# Patient Record
Sex: MALE | Race: WHITE | HISPANIC OR LATINO | Employment: FULL TIME | URBAN - METROPOLITAN AREA
[De-identification: names, ages, dates, MRNs, and addresses within clinical notes are randomized per-mention and may not be internally consistent; named-entity substitution may affect disease eponyms.]

---

## 2024-05-21 ENCOUNTER — HOSPITAL ENCOUNTER (OUTPATIENT)
Dept: GENERAL RADIOLOGY | Age: 69
Discharge: HOME | End: 2024-05-21

## 2024-05-21 DIAGNOSIS — R07.9 CHEST PAIN: ICD-10-CM

## 2024-05-21 PROCEDURE — 71046 X-RAY EXAM CHEST 2 VIEWS: CPT

## 2024-05-22 DIAGNOSIS — R10.11 RUQ PAIN: Primary | ICD-10-CM

## 2024-05-25 ENCOUNTER — E-ADVICE (OUTPATIENT)
Dept: OTHER | Age: 69
End: 2024-05-25

## 2024-05-31 ENCOUNTER — HOSPITAL ENCOUNTER (OUTPATIENT)
Dept: ULTRASOUND IMAGING | Age: 69
End: 2024-05-31
Attending: INTERNAL MEDICINE

## 2024-05-31 DIAGNOSIS — R10.11 RUQ PAIN: ICD-10-CM

## 2024-05-31 PROCEDURE — 76705 ECHO EXAM OF ABDOMEN: CPT

## 2024-06-04 ENCOUNTER — HOSPITAL ENCOUNTER (OUTPATIENT)
Dept: GASTROENTEROLOGY | Age: 69
Discharge: HOME | End: 2024-06-04
Attending: INTERNAL MEDICINE

## 2024-06-04 ENCOUNTER — ANESTHESIA EVENT (OUTPATIENT)
Dept: GASTROENTEROLOGY | Age: 69
End: 2024-06-04

## 2024-06-04 ENCOUNTER — ANESTHESIA (OUTPATIENT)
Dept: GASTROENTEROLOGY | Age: 69
End: 2024-06-04

## 2024-06-04 VITALS
SYSTOLIC BLOOD PRESSURE: 126 MMHG | TEMPERATURE: 97 F | WEIGHT: 172.4 LBS | BODY MASS INDEX: 26.13 KG/M2 | DIASTOLIC BLOOD PRESSURE: 83 MMHG | HEIGHT: 68 IN | RESPIRATION RATE: 13 BRPM | HEART RATE: 65 BPM | OXYGEN SATURATION: 95 %

## 2024-06-04 DIAGNOSIS — Z12.11 ENCOUNTER FOR SCREENING FOR MALIGNANT NEOPLASM OF COLON: ICD-10-CM

## 2024-06-04 PROCEDURE — 10004451 HB PACU RECOVERY 1ST 30 MINUTES

## 2024-06-04 PROCEDURE — 10002807 HB RX 258: Performed by: INTERNAL MEDICINE

## 2024-06-04 PROCEDURE — 13000008 HB ANESTHESIA MAC OUTSIDE OR

## 2024-06-04 PROCEDURE — 13000001 HB PHASE II RECOVERY EA 30 MINUTES

## 2024-06-04 PROCEDURE — 13000024 HB GI COMPLEX CASE S/U + 1ST 15 MIN

## 2024-06-04 PROCEDURE — 10002807 HB RX 258: Performed by: ANESTHESIOLOGY

## 2024-06-04 PROCEDURE — 88305 TISSUE EXAM BY PATHOLOGIST: CPT | Performed by: INTERNAL MEDICINE

## 2024-06-04 PROCEDURE — 13000025 HB GI COMPLEX CASE EACH ADD MINUTE

## 2024-06-04 PROCEDURE — 10002800 HB RX 250 W HCPCS: Performed by: ANESTHESIOLOGY

## 2024-06-04 PROCEDURE — 10002801 HB RX 250 W/O HCPCS: Performed by: ANESTHESIOLOGY

## 2024-06-04 RX ORDER — PROPOFOL 10 MG/ML
INJECTION, EMULSION INTRAVENOUS PRN
Status: DISCONTINUED | OUTPATIENT
Start: 2024-06-04 | End: 2024-06-04

## 2024-06-04 RX ORDER — 0.9 % SODIUM CHLORIDE 0.9 %
2 VIAL (ML) INJECTION EVERY 12 HOURS SCHEDULED
Status: CANCELLED | OUTPATIENT
Start: 2024-06-04

## 2024-06-04 RX ORDER — SODIUM CHLORIDE 9 MG/ML
INJECTION, SOLUTION INTRAVENOUS CONTINUOUS PRN
Status: DISCONTINUED | OUTPATIENT
Start: 2024-06-04 | End: 2024-06-04

## 2024-06-04 RX ORDER — SODIUM CHLORIDE 9 MG/ML
INJECTION, SOLUTION INTRAVENOUS CONTINUOUS
Status: DISCONTINUED | OUTPATIENT
Start: 2024-06-04 | End: 2024-06-06 | Stop reason: HOSPADM

## 2024-06-04 RX ORDER — FAMOTIDINE 20 MG/1
20 TABLET, FILM COATED ORAL 2 TIMES DAILY
COMMUNITY
Start: 2024-05-21

## 2024-06-04 RX ADMIN — SODIUM CHLORIDE: 9 INJECTION, SOLUTION INTRAVENOUS at 08:39

## 2024-06-04 RX ADMIN — PROPOFOL INJECTABLE EMULSION 100 MCG/KG/MIN: 10 INJECTION, EMULSION INTRAVENOUS at 08:43

## 2024-06-04 RX ADMIN — FENTANYL CITRATE 50 MCG: 50 INJECTION INTRAMUSCULAR; INTRAVENOUS at 08:42

## 2024-06-04 RX ADMIN — FENTANYL CITRATE 50 MCG: 50 INJECTION INTRAMUSCULAR; INTRAVENOUS at 08:50

## 2024-06-04 RX ADMIN — PROPOFOL 100 MG: 10 INJECTION, EMULSION INTRAVENOUS at 08:43

## 2024-06-04 RX ADMIN — SODIUM CHLORIDE: 9 INJECTION, SOLUTION INTRAVENOUS at 08:15

## 2024-06-04 SDOH — SOCIAL STABILITY: SOCIAL INSECURITY: HOW OFTEN DOES ANYONE, INCLUDING FAMILY AND FRIENDS, INSULT OR TALK DOWN TO YOU?: NEVER

## 2024-06-04 SDOH — SOCIAL STABILITY: SOCIAL INSECURITY: HOW OFTEN DOES ANYONE, INCLUDING FAMILY AND FRIENDS, PHYSICALLY HURT YOU?: NEVER

## 2024-06-04 SDOH — SOCIAL STABILITY: SOCIAL INSECURITY: HOW OFTEN DOES ANYONE, INCLUDING FAMILY AND FRIENDS, SCREAM OR CURSE AT YOU?: NEVER

## 2024-06-04 SDOH — SOCIAL STABILITY: SOCIAL INSECURITY: HOW OFTEN DOES ANYONE, INCLUDING FAMILY AND FRIENDS, THREATEN YOU WITH HARM?: NEVER

## 2024-06-04 ASSESSMENT — PAIN SCALES - GENERAL
PAINLEVEL_OUTOF10: 0

## 2024-06-04 ASSESSMENT — ACTIVITIES OF DAILY LIVING (ADL)
HISTORY OF FALLING IN THE LAST YEAR (PRIOR TO ADMISSION): NO
ADL_BEFORE_ADMISSION: INDEPENDENT
ADL_SHORT_OF_BREATH: NO
SENSORY_SUPPORT_DEVICES: EYEGLASSES;DENTURES
ADL_SCORE: 12
NEEDS_ASSIST: NO

## 2024-06-06 LAB
ASR DISCLAIMER: NORMAL
CASE RPRT: NORMAL
CLINICAL INFO: NORMAL
PATH REPORT.FINAL DX SPEC: NORMAL
PATH REPORT.GROSS SPEC: NORMAL

## 2024-06-11 ENCOUNTER — HOSPITAL ENCOUNTER (OUTPATIENT)
Dept: ULTRASOUND IMAGING | Age: 69
Discharge: HOME | End: 2024-06-11
Attending: INTERNAL MEDICINE

## 2024-06-11 DIAGNOSIS — R10.11 RUQ PAIN: ICD-10-CM

## 2024-06-11 PROCEDURE — 76775 US EXAM ABDO BACK WALL LIM: CPT

## 2024-07-03 NOTE — PROGRESS NOTES
[facility]  Initial Office Consultation    HPI:   Olayinka Echevarria is a 69 year old male with PMHx of CVA and GERD here today for an elevated PSA. Presents today with daughter.    Patient states he had labs drawn with his PCP at an outside facility, and was informed that his PSA was elevated. At that time, his PSA was 38.1. No urinary complaints.  IPSS score of 12  (4/3/1/3/0/0/1) with QoL index of 1. PVR 3mL. He denies unexplained weight loss and bone pain. He has no family history of prostate cancer.    Past Medical History:    CVA (cerebral vascular accident) (HCC)    GERD (gastroesophageal reflux disease)    Prediabetes     History reviewed. No pertinent surgical history.  History reviewed. No pertinent family history.  Social History     Socioeconomic History    Marital status:      Social Determinants of Health     Financial Resource Strain: Not at Risk (2024)    Received from [employer]    Financial Resource Strain     Financial Resource Strain: 1   Food Insecurity: Not at Risk (2024)    Received from [employer]    Food Insecurity     Food: 1   Transportation Needs: Not at Risk (2024)    Received from [employer]    Transportation Needs     Transportation: 1   Physical Activity: Not on File (5/3/2024)    Received from [employer]    Physical Activity     Physical Activity: 0   Stress: Not on File (5/3/2024)    Received from [employer]    Stress     Stress: 0   Social Connections: Not on File (5/3/2024)    Received from [employer]    Social Connections     Social Connections and Isolation: 0   Housing Stability: Not at Risk (2024)    Received from [employer]    Housing Stability     Housin     Current Outpatient Medications   Medication Sig Dispense Refill    aspirin 81 MG Oral Tab EC Take 1 tablet (81 mg total) by mouth daily.      atorvastatin 40 MG Oral Tab Take 1 tablet (40 mg total) by mouth nightly.      omeprazole 20 MG Oral Capsule Delayed Release 1 twenty minutes before meal Orally twice a  day for 30 days      celecoxib 50 MG Oral Cap Take 1 capsule (50 mg total) by mouth daily as needed.         Allergies: Patient has no known allergies.    REVIEW OF SYSTEMS:  Review of Systems   All other systems reviewed and are negative.        EXAM:  /79 (BP Location: Right arm, Patient Position: Sitting, Cuff Size: adult)   Pulse 87   Ht 5' 6\" (1.676 m)   Wt 171 lb (77.6 kg)   BMI 27.60 kg/m²     Physical Exam  Constitutional:       Appearance: Normal appearance.   HENT:      Head: Normocephalic.      Mouth/Throat:      Mouth: Mucous membranes are moist.   Cardiovascular:      Pulses: Normal pulses.   Pulmonary:      Effort: Pulmonary effort is normal.   Abdominal:      General: Abdomen is flat.      Palpations: Abdomen is soft.   Genitourinary:     Prostate: Normal.      Comments: EFFIE: Prostate enlarged, smooth, non-tender and without nodules  Skin:     General: Skin is warm and dry.   Neurological:      Mental Status: He is alert and oriented to person, place, and time.   Psychiatric:         Mood and Affect: Mood normal.         Behavior: Behavior normal.         Thought Content: Thought content normal.         Judgment: Judgment normal.          LABS:  No results found for: \"PSA\", \"QPSA\", \"TOTPSASCREEN\"  No results found for: \"WBC\", \"RBC\", \"HGB\", \"HCT\", \"MCV\", \"MCH\", \"MCHC\", \"RDW\", \"PLT\", \"MPV\"  No results found for: \"GLU\", \"BUN\", \"BUNCREA\", \"CREATSERUM\", \"ANIONGAP\", \"GFR\", \"GFRNAA\", \"GFRAA\", \"CA\", \"NA\", \"K\", \"CL\", \"CO2\"  No results found for: \"PTP\", \"PT\", \"INR\"    IMAGING:  No results found.    IMPRESSION:  Elevated PSA    PLAN:  - Repeat PSA in 6 weeks  - Schedule follow-up for after repeat labs    Adalgisa Kessler, JESS  7/5/2024

## 2024-07-05 ENCOUNTER — OFFICE VISIT (OUTPATIENT)
Dept: SURGERY | Facility: CLINIC | Age: 69
End: 2024-07-05

## 2024-07-05 VITALS
SYSTOLIC BLOOD PRESSURE: 121 MMHG | WEIGHT: 171 LBS | BODY MASS INDEX: 27.48 KG/M2 | DIASTOLIC BLOOD PRESSURE: 79 MMHG | HEIGHT: 66 IN | HEART RATE: 87 BPM

## 2024-07-05 DIAGNOSIS — R97.20 ELEVATED PSA: Primary | ICD-10-CM

## 2024-07-05 PROCEDURE — 99202 OFFICE O/P NEW SF 15 MIN: CPT

## 2024-07-05 RX ORDER — ASPIRIN 81 MG/1
81 TABLET ORAL DAILY
COMMUNITY
Start: 2024-06-21

## 2024-07-05 RX ORDER — ATORVASTATIN CALCIUM 40 MG/1
40 TABLET, FILM COATED ORAL NIGHTLY
COMMUNITY
Start: 2024-06-21

## 2024-07-05 RX ORDER — OMEPRAZOLE 20 MG/1
CAPSULE, DELAYED RELEASE ORAL
COMMUNITY
Start: 2024-06-28

## 2024-07-05 RX ORDER — CELECOXIB 50 MG/1
50 CAPSULE ORAL DAILY PRN
COMMUNITY

## 2024-08-08 ENCOUNTER — LAB ENCOUNTER (OUTPATIENT)
Dept: LAB | Age: 69
End: 2024-08-08
Payer: MEDICARE

## 2024-08-08 DIAGNOSIS — R97.20 ELEVATED PSA: ICD-10-CM

## 2024-08-08 LAB — PSA SERPL-MCNC: 41.52 NG/ML (ref ?–4)

## 2024-08-08 PROCEDURE — 84153 ASSAY OF PSA TOTAL: CPT

## 2024-08-12 NOTE — PROGRESS NOTES
[facility]  Initial Office Consultation    HPI:   Olayinka Echevarria is a 69 year old male here today for follow-up of an elevated PSA. Previous visit was 2024. Presents today with daughter.    Patient states he had labs drawn with his PCP at an outside facility, and was informed that his PSA was elevated. At that time, his PSA was 38.1. Current PSA of 41.52. No urinary complaints. He denies unexplained weight loss and bone pain. He has no family history of prostate cancer.      Past Medical History:    CVA (cerebral vascular accident) (HCC)    GERD (gastroesophageal reflux disease)    Prediabetes     No past surgical history on file.  No family history on file.  Social History     Socioeconomic History    Marital status:      Social Determinants of Health     Financial Resource Strain: Not at Risk (2024)    Received from [employer]    Financial Resource Strain     Financial Resource Strain: 1   Food Insecurity: Not at Risk (2024)    Received from [employer]    Food Insecurity     Food: 1   Transportation Needs: Not at Risk (2024)    Received from [employer]    Transportation Needs     Transportation: 1   Physical Activity: Not on File (5/3/2024)    Received from [employer]    Physical Activity     Physical Activity: 0   Stress: Not on File (5/3/2024)    Received from [employer]    Stress     Stress: 0   Social Connections: Not on File (5/3/2024)    Received from [employer]    Social Connections     Social Connections and Isolation: 0   Housing Stability: Not at Risk (2024)    Received from [employer]    Housing Stability     Housin     Current Outpatient Medications   Medication Sig Dispense Refill    aspirin 81 MG Oral Tab EC Take 1 tablet (81 mg total) by mouth daily.      atorvastatin 40 MG Oral Tab Take 1 tablet (40 mg total) by mouth nightly.      omeprazole 20 MG Oral Capsule Delayed Release 1 twenty minutes before meal Orally twice a day for 30 days      celecoxib 50 MG Oral Cap Take 1  capsule (50 mg total) by mouth daily as needed.         Allergies: Patient has no known allergies.    REVIEW OF SYSTEMS:  Review of Systems   All other systems reviewed and are negative.        EXAM:  There were no vitals taken for this visit.    Physical Exam  Constitutional:       Appearance: Normal appearance.   HENT:      Head: Normocephalic.      Mouth/Throat:      Mouth: Mucous membranes are moist.   Cardiovascular:      Pulses: Normal pulses.   Pulmonary:      Effort: Pulmonary effort is normal.   Abdominal:      General: Abdomen is flat.      Palpations: Abdomen is soft.   Genitourinary:     Prostate: Normal.      Comments: EFFIE: Prostate enlarged, smooth, non-tender and without nodules  Skin:     General: Skin is warm and dry.   Neurological:      Mental Status: He is alert and oriented to person, place, and time.   Psychiatric:         Mood and Affect: Mood normal.         Behavior: Behavior normal.         Thought Content: Thought content normal.         Judgment: Judgment normal.          LABS:  Lab Results   Component Value Date    PSA 41.52 (H) 08/08/2024     No results found for: \"WBC\", \"RBC\", \"HGB\", \"HCT\", \"MCV\", \"MCH\", \"MCHC\", \"RDW\", \"PLT\", \"MPV\"  No results found for: \"GLU\", \"BUN\", \"BUNCREA\", \"CREATSERUM\", \"ANIONGAP\", \"GFR\", \"GFRNAA\", \"GFRAA\", \"CA\", \"NA\", \"K\", \"CL\", \"CO2\"  No results found for: \"PTP\", \"PT\", \"INR\"    IMAGING:  No results found.    IMPRESSION:  Elevated PSA    The patient is requesting we contact his daughter, Sandy Echevarria [phone] with results and updates    PLAN:  - Schedule Prostate MRI  - Consider prostate biopsy following results of MRI    Adalgisa Kessler, JESS  08/15/2024

## 2024-08-15 ENCOUNTER — OFFICE VISIT (OUTPATIENT)
Dept: SURGERY | Facility: CLINIC | Age: 69
End: 2024-08-15

## 2024-08-15 DIAGNOSIS — R97.20 ELEVATED PSA: Primary | ICD-10-CM

## 2024-08-15 PROCEDURE — 99213 OFFICE O/P EST LOW 20 MIN: CPT

## 2024-10-16 ENCOUNTER — HOSPITAL ENCOUNTER (OUTPATIENT)
Dept: MRI IMAGING | Facility: HOSPITAL | Age: 69
Discharge: HOME | End: 2024-10-16
Payer: MEDICARE

## 2024-10-16 DIAGNOSIS — R97.20 ELEVATED PSA: ICD-10-CM

## 2024-10-16 PROCEDURE — A9575 INJ GADOTERATE MEGLUMI 0.1ML: HCPCS

## 2024-10-16 PROCEDURE — 72197 MRI PELVIS W/O & W/DYE: CPT

## 2024-10-16 RX ORDER — GADOTERATE MEGLUMINE 376.9 MG/ML
20 INJECTION INTRAVENOUS
Status: COMPLETED | OUTPATIENT
Start: 2024-10-16 | End: 2024-10-16

## 2024-10-16 RX ADMIN — GADOTERATE MEGLUMINE 16 ML: 376.9 INJECTION INTRAVENOUS at 10:13:00

## 2024-10-17 ENCOUNTER — TELEPHONE (OUTPATIENT)
Dept: SURGERY | Facility: CLINIC | Age: 69
End: 2024-10-17

## 2024-10-17 NOTE — TELEPHONE ENCOUNTER
The patient is requesting we contact his daughter, Sandy Echevarria [phone]    Urology Surgery Scheduling Request    Location: [facility] Office    Surgeon: Next Available     Asst. Surgeon: N/A    Diagnosis: Elevated PSA    Procedure: Uronav Prostate Biopsy    Procedure CPT Code (if known):     Anesthesia: Local    Time Frame: Next Available    Time needed: 30 minutes    Special Equipment: Uronav, Ultrasound    On Call to OR: Depends on physician    Admission:  N/A    Pre-op Testing: N/A     Need Pre-op Clearance:  N/A    Estimated Post Op/Follow Up Appt:  ELAINE Kessler, APRN  10/17/2024

## 2024-11-04 ENCOUNTER — TELEPHONE (OUTPATIENT)
Dept: SURGERY | Facility: CLINIC | Age: 69
End: 2024-11-04

## 2024-11-04 NOTE — TELEPHONE ENCOUNTER
Patients daughter awaiting call to schedule prostate biopsy procedure. Please call with  at [phone],thanks.

## 2024-11-07 NOTE — TELEPHONE ENCOUNTER
Contacted the patient's daughter, Sandy via [employer] (Cassie, #[phone]).  Spoke with the daughter, she would like to schedule at the [facility] with an [facility].  Will forward to [facility] Scheduling.

## 2024-11-07 NOTE — TELEPHONE ENCOUNTER
Printed the ticket.  (Ticket was not routed to [facility] scheduling)  Will contact the patient.

## 2024-12-08 RX ORDER — CEFDINIR 300 MG/1
CAPSULE ORAL
Qty: 2 CAPSULE | Refills: 0 | Status: CANCELLED | OUTPATIENT
Start: 2024-12-08

## 2024-12-08 RX ORDER — CIPROFLOXACIN 500 MG/1
TABLET, FILM COATED ORAL
Qty: 2 TABLET | Refills: 0 | Status: CANCELLED | OUTPATIENT
Start: 2024-12-08

## 2024-12-09 ENCOUNTER — PROCEDURE (OUTPATIENT)
Dept: SURGERY | Facility: CLINIC | Age: 69
End: 2024-12-09

## 2024-12-09 VITALS — DIASTOLIC BLOOD PRESSURE: 95 MMHG | HEART RATE: 78 BPM | SYSTOLIC BLOOD PRESSURE: 148 MMHG

## 2024-12-09 DIAGNOSIS — R97.20 ELEVATED PSA: Primary | ICD-10-CM

## 2024-12-09 PROCEDURE — 76942 ECHO GUIDE FOR BIOPSY: CPT | Performed by: UROLOGY

## 2024-12-09 PROCEDURE — 55700 BIOPSY OF PROSTATE,NEEDLE/PUNCH: CPT | Performed by: UROLOGY

## 2024-12-09 RX ORDER — CIPROFLOXACIN 500 MG/1
TABLET, FILM COATED ORAL
Qty: 3 TABLET | Refills: 0 | Status: SHIPPED | OUTPATIENT
Start: 2024-12-09

## 2024-12-09 RX ORDER — CEFDINIR 300 MG/1
CAPSULE ORAL
Qty: 3 CAPSULE | Refills: 0 | Status: SHIPPED | OUTPATIENT
Start: 2024-12-09

## 2024-12-09 NOTE — PROGRESS NOTES
Olayinka Echevarria is a 69 year old male.    HPI:     Chief Complaint   Patient presents with    Procedure     Uronav prostate biopsy       69-year-old male presents for MRI ultrasound fusion prostate biopsy.  Seen most recently by physician assistant Checo August 15, 2024 for an elevated PSA most recently at 41.52.  Referred for a prostate MRI 2024 which identified 5 abnormal regions in the prostate 3 of which are PI-RADS 5 and 2 are PI-RADS 4.  He presents today for an MRI ultrasound fusion biopsy.        HISTORY:  Past Medical History:    CVA (cerebral vascular accident) (HCC)    GERD (gastroesophageal reflux disease)    Prediabetes      No past surgical history on file.   No family history on file.   Social History:   Social History     Socioeconomic History    Marital status:      Social Drivers of Health     Financial Resource Strain: Not at Risk (2024)    Received from [employer]    Financial Resource Strain     Financial Resource Strain: 1   Food Insecurity: Not at Risk (2024)    Received from [employer]    Food Insecurity     Food: 1   Transportation Needs: Not at Risk (2024)    Received from [employer]    Transportation Needs     Transportation: 1   Physical Activity: Not on File (5/3/2024)    Received from [employer]    Physical Activity     Physical Activity: 0   Stress: Not on File (5/3/2024)    Received from [employer]    Stress     Stress: 0   Social Connections: Not on File (2024)    Received from [employer]    Social Connections     Connectedness: 0   Housing Stability: Not at Risk (2024)    Received from [employer]    Housing Stability     Housin        Medications (Active prior to today's visit):  Current Outpatient Medications   Medication Sig Dispense Refill    cefdinir 300 MG Oral Cap Take 1 cap by mouth every 12 hrs on the day of your procedure. 3 capsule 0    ciprofloxacin 500 MG Oral Tab Take 1 tab by mouth every 12 hours on the day of your procedure. 3 tablet 0    aspirin 81  MG Oral Tab EC Take 1 tablet (81 mg total) by mouth daily.      atorvastatin 40 MG Oral Tab Take 1 tablet (40 mg total) by mouth nightly.      omeprazole 20 MG Oral Capsule Delayed Release 1 twenty minutes before meal Orally twice a day for 30 days      celecoxib 50 MG Oral Cap Take 1 capsule (50 mg total) by mouth daily as needed.         Allergies:  Allergies[1]      ROS:       PHYSICAL EXAM:   Diagnosis:   Elevated PSA, abnormal prostate MRI, abnormal digital prostate exam  Procedure: MRI ultrasound fusion transrectal ultrasound and prostate biopsy.   Anesthesia: 2% viscous lidocaine gel, 1% lidocaine 7 Ml   Prostate Volume: 47 g  Prostate US abnormalities: None  Seminal Vesicles: Grossly normal  Bladder: Not well-seen  Biopsies obtained: 15 (3 samples from each regions of interest).  No random samples were done as the lesions were scattered between the right and left side of the prostate    Complications: None  Blood loss: Minimal       ASSESSMENT/PLAN:   Assessment   Encounter Diagnosis   Name Primary?    Elevated PSA Yes       Follow-up based on results.  He will be notified once available.         Orders This Visit:  No orders of the defined types were placed in this encounter.      Meds This Visit:  Requested Prescriptions     Signed Prescriptions Disp Refills    cefdinir 300 MG Oral Cap 3 capsule 0     Sig: Take 1 cap by mouth every 12 hrs on the day of your procedure.    ciprofloxacin 500 MG Oral Tab 3 tablet 0     Sig: Take 1 tab by mouth every 12 hours on the day of your procedure.       Imaging & Referrals:  None     12/9/2024  Hung Oconnor MD               [1] No Known Allergies

## 2024-12-13 ENCOUNTER — TELEPHONE (OUTPATIENT)
Dept: SURGERY | Facility: CLINIC | Age: 69
End: 2024-12-13

## 2024-12-13 NOTE — TELEPHONE ENCOUNTER
I called pt with the assistance of [place] interpretor Sierra, Id # [id number] and we reached pt's dtr however I did not have an MONIQUE on file to be able to s/w her about pt's issues and she told the interpretor that she already s/w BEN and he informed her of pt's results and to her to [facility] a PET scan for pt and [facility] and they told her that they would call her back to [facility]. I told her that this is fine but that we need her or pt to call the office after the appt is arranged so that we can arrange an appt for pt to have a discussion appt with BEN. Dtr verbalized understanding and compliance.

## 2024-12-13 NOTE — TELEPHONE ENCOUNTER
----- Message from Hung Oconnor sent at 12/13/2024  7:56 AM CST -----  Daughter gave us an alternate number.  I called the patient.  I reveiwed with his results of his prostate biopsy.  He was informed that cancer was detected in the prostate.  I advised that I would like to see him for a cancer discussion appointment after he has had a PSMA PET scan and he is aware.  My office will arrange.  Order for PET placed.

## 2024-12-16 ENCOUNTER — TELEPHONE (OUTPATIENT)
Dept: SURGERY | Facility: CLINIC | Age: 69
End: 2024-12-16

## 2024-12-16 NOTE — TELEPHONE ENCOUNTER
Patients daughter Sandy calling to request pet scan follow up sooner than 2/25/25. Patient has pet scan scheduled 12/20, please call with  at [phone],thanks.

## 2024-12-17 NOTE — TELEPHONE ENCOUNTER
I called pt daughterSandy verified name/ of pt and scheduled pt for cancer discussion on 24 11:40am with BEN. Location to office given and informed pt of late policy. Pt daughter is aware and understands. Appointment booked and all ended.

## 2024-12-20 ENCOUNTER — HOSPITAL ENCOUNTER (OUTPATIENT)
Dept: NUCLEAR MEDICINE | Facility: HOSPITAL | Age: 69
Discharge: HOME | End: 2024-12-20
Attending: UROLOGY
Payer: MEDICARE

## 2024-12-20 DIAGNOSIS — C61 PROSTATE CANCER (HCC): ICD-10-CM

## 2024-12-20 PROCEDURE — 78815 PET IMAGE W/CT SKULL-THIGH: CPT | Performed by: UROLOGY

## 2024-12-23 ENCOUNTER — OFFICE VISIT (OUTPATIENT)
Dept: SURGERY | Facility: CLINIC | Age: 69
End: 2024-12-23

## 2024-12-23 DIAGNOSIS — C61 PROSTATE CANCER (HCC): Primary | ICD-10-CM

## 2024-12-23 PROCEDURE — 99215 OFFICE O/P EST HI 40 MIN: CPT | Performed by: UROLOGY

## 2024-12-23 RX ORDER — BICALUTAMIDE 50 MG/1
50 TABLET, FILM COATED ORAL NIGHTLY
Qty: 14 TABLET | Refills: 0 | Status: SHIPPED | OUTPATIENT
Start: 2024-12-23

## 2024-12-23 NOTE — PROGRESS NOTES
Olayinka Echevarria is a 69 year old male.    HPI:     Chief Complaint   Patient presents with    Follow - Up     Patient is here to review uronav biopsy results. Patient is here with his daughter.     :        69-year-old male accompanied by his daughter in follow-up to a MRI ultrasound fusion prostate biopsy performed December 9, 2024 for an elevated serum PSA of 41.52 and an abnormal digital prostate exam.  Biopsy demonstrated multifocal cancer highest Josefina grade 4+5 with perineural invasion.  He was referred for a PSMA PET scan performed December 20, 2024 demonstrating nodular PET avid foci within the prostate, right seminal vesicle, avid lymph nodes within the pelvis and retroperitoneum as well as in the retrocrural chains and left clavicular chain nodes.  There are also several PET avid bone lesions throughout the spine ribs sternum left scapula and pelvis compatible with bone metastasis.  I reviewed these results with the patient and his daughter who accompanies him today.      HISTORY:  Past Medical History:    CVA (cerebral vascular accident) (HCC)    GERD (gastroesophageal reflux disease)    Prediabetes      No past surgical history on file.   No family history on file.   Social History:   Social History     Socioeconomic History    Marital status:      Social Drivers of Health     Financial Resource Strain: Not at Risk (6/21/2024)    Received from [employer]    Financial Resource Strain     Financial Resource Strain: 1   Food Insecurity: Not at Risk (6/21/2024)    Received from [employer]    Food Insecurity     Food: 1   Transportation Needs: Not at Risk (6/21/2024)    Received from [employer]    Transportation Needs     Transportation: 1   Physical Activity: Not on File (5/3/2024)    Received from [employer]    Physical Activity     Physical Activity: 0   Stress: Not on File (5/3/2024)    Received from [employer]    Stress     Stress: 0   Social Connections: Not on File (9/4/2024)    Received from  [facility]    Social Connections     Connectedness: 0   Housing Stability: Not at Risk (2024)    Received from [facility]     Housin        Medications (Active prior to today's visit):  Current Outpatient Medications   Medication Sig Dispense Refill    bicalutamide 50 MG Oral Tab Take 1 tablet (50 mg total) by mouth nightly. 14 tablet 0    cefdinir 300 MG Oral Cap Take 1 cap by mouth every 12 hrs on the day of your procedure. 3 capsule 0    ciprofloxacin 500 MG Oral Tab Take 1 tab by mouth every 12 hours on the day of your procedure. 3 tablet 0    aspirin 81 MG Oral Tab EC Take 1 tablet (81 mg total) by mouth daily.      atorvastatin 40 MG Oral Tab Take 1 tablet (40 mg total) by mouth nightly.      omeprazole 20 MG Oral Capsule Delayed Release 1 twenty minutes before meal Orally twice a day for 30 days      celecoxib 50 MG Oral Cap Take 1 capsule (50 mg total) by mouth daily as needed.         Allergies:  Allergies[1]      ROS:       PHYSICAL EXAM:        ASSESSMENT/PLAN:   Assessment   Encounter Diagnosis   Name Primary?    Prostate cancer (HCC) Yes       I had a long discussion with the patient and daughter.  I recommended starting the patient on androgen deprivation therapy with Eligard 3-month injection and bicalutamide 50 mg daily for 2 weeks to prevent flare.  I discussed the overall prognosis of metastatic high-grade prostate cancer.  I recommended a medical oncology consultation to consider second line treatments as the patient is relatively young and healthy.  Their contact information was provided to the patient.  We agreed that he would follow-up in 3 months but that medical oncology would likely take over his treatment including his androgen deprivation therapy injections in the future.  I spent well over 50 minutes with patient more than half the time in face-to-face discussion.         Orders This Visit:  No orders of the defined types were placed in this encounter.      Meds This  Visit:  Requested Prescriptions     Signed Prescriptions Disp Refills    bicalutamide 50 MG Oral Tab 14 tablet 0     Sig: Take 1 tablet (50 mg total) by mouth nightly.       Imaging & Referrals:  ONCOLOGY/HEMATOLOGY - INTERNAL     12/23/2024  Hung Oconnor MD               [1] No Known Allergies

## 2024-12-23 NOTE — TELEPHONE ENCOUNTER
Patient came to  trying to schedule new consult appointment. Patient is being referred by Dr. Stein. Please call patient back to schedule new consult appointment. 12/23/24 GA

## 2024-12-23 NOTE — PROGRESS NOTES
I was asked by BEN to administer this pt an Eligard 22.5 mg 3 month injection. I checked that no PA was needed so I told pt that the med needed to warm for 20 min and that I would be back in to administer it. I then took the Eligard out of the Fridge and set he timer for 20 min. When the timer went off I took the Eligard  to the exam room and I verified the pt's name and . I explained that I was asked to administer his Eligard injection and I informed him of the sites that the injection could be given and also that the rep informed that the med was best absorbed when given in the Abdomen. Pt opted to have his injection in the Lt. Upper abdomen. I then washed my hands and I donned my chemo gown and double gloves and I mixed the Eligard in front of the pt and I then prepped the skin at that site with an alcohol wipe and gave the Eligard injection at a 90 degree angle and pt tolerated it well. I then applied 2 bandages in a cross manner to that site. I explained to pt the possibility of an injection site reaction and I also explained what measures he can take to treat that reaction if one should occur.  I also gave the pt. A hand out on the drug and injection site reaction. Pt will f/u in three months after he finishes his radiation TX's. I also reviewed the AVS. Pt verbalized understanding and compliance.

## 2025-01-06 ENCOUNTER — OFFICE VISIT (OUTPATIENT)
Age: 70
End: 2025-01-06
Attending: INTERNAL MEDICINE
Payer: MEDICARE

## 2025-01-06 VITALS
HEIGHT: 64.57 IN | WEIGHT: 175.88 LBS | OXYGEN SATURATION: 95 % | HEART RATE: 78 BPM | BODY MASS INDEX: 29.66 KG/M2 | TEMPERATURE: 98 F | SYSTOLIC BLOOD PRESSURE: 154 MMHG | DIASTOLIC BLOOD PRESSURE: 90 MMHG | RESPIRATION RATE: 18 BRPM

## 2025-01-06 DIAGNOSIS — C77.5 MALIGNANT NEOPLASM METASTATIC TO INTRAPELVIC LYMPH NODE (HCC): ICD-10-CM

## 2025-01-06 DIAGNOSIS — C61 PROSTATE CANCER (HCC): Primary | ICD-10-CM

## 2025-01-06 DIAGNOSIS — C79.51 METASTASIS TO BONE (HCC): ICD-10-CM

## 2025-01-06 RX ORDER — ABIRATERONE ACETATE 250 MG/1
1000 TABLET ORAL DAILY
Qty: 120 TABLET | Refills: 6 | Status: SHIPPED | OUTPATIENT
Start: 2025-01-06

## 2025-01-06 NOTE — CONSULTS
[facility] Hematology/Oncology Consultation Note    Patient Name: Olayinka Echevarria   YOB: 1955   Medical Record Number: [id number]   CSN: [id number]   Consulting Physician: Thomas Pompa MD  Referring Physician(s): Dr Hung Oconnor MD  Date of Consultation: 1/6/2025     Reason for Consultation:  Metastatic Prostate Cancer    History of Present Illness:   Olayinka Echevarria is a 69 year old male that was seen today in the Cancer Center for newly diagnosed de-kristin metastatic prostate cancer.  His oncologic history is detailed below    8/8/24 Screening PSA was elevated at 41. He has been asymptomatic previously    10/16/24 MRI of the prostate showed multiple highly suspicious PI-RADS 5 and 4 lesions in the right aspect the prostate gland with invasion of the right seminal vesicle.  Markedly enlarged right iliac chain lymph node and prominent left iliac chain lymph nodes were noted.    12/11/24 TRUS prostate biopsy showed multiple cores of Vina grade group 3 [4+3] prostatic adenocarcinoma in 9 of 9 sampled cores.  There were 3 cores with Vina grade group 5 [4+5] prostatic adenocarcinoma involving up to 75% of sampled tissue.    12/20/2024 PSMA PET scan showed PET avid foci within the prostate as well as seminal vesicle uptake.  Multiple PET avid lymph nodes within the pelvis retroperitoneum as well as the retrocrural area was noted.  There was also enlarged nodes in the left clavicle chain.  Several PET avid bone lesions throughout the spine and ribs sternum left scapular and pelvis were noted.    He started androgen deprivation therapy with a dose of Eligard on 12/20/2024 and was referred here for discussion regarding systemic therapy.    The patient is quite asymptomatic from his diagnosis.  He denies any recent weight changes or new bone pain.  He works in [employer] as a  and works out regularly.      Past Medical History:  Past Medical History:    CVA (cerebral vascular  accident) (HCC)    GERD (gastroesophageal reflux disease)    Prediabetes       Past Surgical History:  No past surgical history on file.    Family Medical History:  No family history on file.        Social History:  Social History     Socioeconomic History    Marital status:      Spouse name: Not on file    Number of children: Not on file    Years of education: Not on file    Highest education level: Not on file   Occupational History    Not on file   Tobacco Use    Smoking status: Never     Passive exposure: Never    Smokeless tobacco: Never   Vaping Use    Vaping status: Never Used   Substance and Sexual Activity    Alcohol use: Not Currently    Drug use: Never    Sexual activity: Not on file   Other Topics Concern    Not on file   Social History Narrative    Not on file     Social Drivers of Health     Financial Resource Strain: Not at Risk (2024)    Received from [employer]    Financial Resource Strain     Financial Resource Strain: 1   Food Insecurity: Not at Risk (2024)    Received from [employer]    Food Insecurity     Food: 1   Transportation Needs: Not at Risk (2024)    Received from [employer]    Transportation Needs     Transportation: 1   Physical Activity: Not on File (5/3/2024)    Received from [employer]    Physical Activity     Physical Activity: 0   Stress: Not on File (5/3/2024)    Received from [employer]    Stress     Stress: 0   Social Connections: Not on File (2024)    Received from [employer]    Social Connections     Connectedness: 0   Housing Stability: Not at Risk (2024)    Received from [employer]    Housing Stability     Housin       Allergies:   Allergies[1]    Current Medications:   Abiraterone Acetate 250 MG Oral Tab Take 1,000 mg by mouth daily. 120 tablet 6    cefdinir 300 MG Oral Cap Take 1 cap by mouth every 12 hrs on the day of your procedure. 3 capsule 0    aspirin 81 MG Oral Tab EC Take 1 tablet (81 mg total) by mouth daily.      atorvastatin 40 MG Oral Tab Take 1 tablet (40 mg  total) by mouth nightly.      omeprazole 20 MG Oral Capsule Delayed Release 1 twenty minutes before meal Orally twice a day for 30 days      celecoxib 50 MG Oral Cap Take 1 capsule (50 mg total) by mouth daily as needed.        leuprolide (Eligard 3 Month) SUBQ injection 22.5 mg  22.5 mg Subcutaneous Q3 Months    22.5 mg at 12/23/24 1323       Review of Systems:    A comprehensive 14 point review of systems was completed.  Pertinent positives and negatives noted in the the HPI.     Vital Signs:  /90 (BP Location: Left arm, Patient Position: Sitting, Cuff Size: adult)   Pulse 78   Temp 97.6 °F (36.4 °C)   Resp 18   Ht 1.64 m (5' 4.57\")   Wt 79.8 kg (175 lb 14.4 oz)   SpO2 95%   BMI 29.67 kg/m²     Physical Examination:    General: Patient is alert and oriented x 3, not in acute distress.  HEENT: EOMs intact. PERRL. Oropharynx is clear.   Neck: No JVD. No palpable lymphadenopathy. Neck is supple.  Lymphatics: There is no palpable lymphadenopathy throughout in the cervical, supraclavicular or axillary regions.  Chest: Clear to auscultation. No wheezes or rales.  Heart: Regular rate and rhythm. S1S2 normal.  Abdomen: Soft, non tender, no hepatosplenomegaly.  No palpable mass.  Extremities: No edema or calf tenderness.  Neurological: Grossly intact.     Performance Status:    ECOG-0    Labs:    Imaging:    Reviewed PSMA films personally and with pt/family    Pathology:    Reviewd prostate bx as above    Impression:      ICD-10-CM    1. Prostate cancer (HCC)  C61       2. Metastasis to bone (HCC)  C79.51       3. 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  I had a long discussion with the patient explained that unfortunately he has an incurable cancer that is however very treatable with androgen deprivation therapy/ARPI as well as chemotherapy.  We  discussed that given his excellent performance status and the high grade/high-volume disease he would be a good candidate for triplet therapy.  We discussed the improved PFS and OS benefits seen with this approach    He and his family want to proceed with triplet treatment    Plan:  Will plan to start the patient on docetaxel along with abiraterone and prednisone [as per the PEACE-1 data set]  Anticipated side effects of chemotherapy including asthenia myelosuppression alopecia etc. were discussed in detail  Will also plan to start on zoledronate with the chemotherapy to help reduce the risk of skeletal related events  Arrange abiraterone through specialty pharmacy and will have him meet with the oncology NP for detailed chemo education soon  [facility] NGS and germline testing  RTC for fu with first dose of chemotherapy    Emotional Well Being:    Emotional Well Being discussed, patient aware of support systems available through the Cancer Center. No acute issues.     The diagnosis, prognosis, treatment goals, plan for treatment, and expected response was explained to the patient.       Thank you Dr Hung Oconnor MD for the opportunity to participate in the care of this interesting patient. Please do contact me if I may be of any further assistance    Thomas Pompa MD  [facility]     Total time on this consult was  65 minutes, more than 50% of the time was spent in counseling and/or coordination of care related to newly diagnosed prostate cancer.         [1] No Known Allergies

## 2025-01-06 NOTE — PROGRESS NOTES
Next generation genomic sequencing order from Dr. Pompa sent via [facility]  to [facility] on 1/6/2025 .  Specimen case #[id number] .  Additional information xT and xG.

## 2025-01-07 ENCOUNTER — TELEPHONE (OUTPATIENT)
Age: 70
End: 2025-01-07

## 2025-01-07 NOTE — TELEPHONE ENCOUNTER
Message sent from [facility] that patient does not have prescription coverage for Abiraterone. Spoke with Greta in prior auth and no funding available.  [facility] pharmacy estimated monthly cost would be around $209. Daughter called. Per daughter patient not able to monthly out of pocket expense. Daughter informed will discuss with Dr. Pompa. Message sent to Nanette ARNETT to see any other assistance available.

## 2025-01-07 NOTE — TELEPHONE ENCOUNTER
Daughter called and notified that Dr. Pompa changing prescription from Abiraterone to Darolutamide due to cost of Abiraterone and better financial assistance. Daughter verbalizes understanding.

## 2025-01-08 DIAGNOSIS — C61 PROSTATE CANCER (HCC): ICD-10-CM

## 2025-01-08 DIAGNOSIS — C79.51 METASTASIS TO BONE (HCC): ICD-10-CM

## 2025-01-08 RX ORDER — PREDNISONE 10 MG/1
10 TABLET ORAL DAILY
Qty: 30 TABLET | Refills: 5 | Status: SHIPPED | OUTPATIENT
Start: 2025-01-08

## 2025-01-10 ENCOUNTER — OFFICE VISIT (OUTPATIENT)
Age: 70
End: 2025-01-10
Attending: INTERNAL MEDICINE
Payer: MEDICARE

## 2025-01-10 DIAGNOSIS — C61 PROSTATE CANCER (HCC): Primary | ICD-10-CM

## 2025-01-10 DIAGNOSIS — C79.51 METASTASIS TO BONE (HCC): ICD-10-CM

## 2025-01-10 RX ORDER — ONDANSETRON 8 MG/1
8 TABLET, FILM COATED ORAL EVERY 8 HOURS PRN
Qty: 30 TABLET | Refills: 3 | Status: SHIPPED | OUTPATIENT
Start: 2025-01-10

## 2025-01-10 RX ORDER — PROCHLORPERAZINE MALEATE 10 MG
10 TABLET ORAL EVERY 6 HOURS PRN
Qty: 30 TABLET | Refills: 3 | Status: SHIPPED | OUTPATIENT
Start: 2025-01-10

## 2025-01-10 NOTE — PROGRESS NOTES
Medication Education Record:  IV/Oral Cancer    Learner:  Patient and daughter    Barriers / Limitations:  None    Psychosocial Assessment:  patient psychosocial response appropriate    Diagnosis:   Prostate Cancer    Readiness of the patient to learn and adhere to oral cancer treatment outside of the cancer center:  Yes    IV Cancer Treatment Name(s): Docetaxel  IV Cancer Treatment Frequency Every 21 days     Number of cycles planned Up to 6 cycles based on tolerance and response    Oral Cancer Treatment Medication(s):  Medication Name Dose/Strength Frequency   Darolutamide Take 600mg by mouth twice daily  Daily with food   Prednisone 10mg Take with darolutamide        Schedule of oral medication(s):Daily, with food    Verified Consent to Chemotherapy/Biotherapy Cancer Treatment:  form signed by patient and provider:  Yes    RN/PA Verified prescription bottle against physician order: no, awaiting medication delivery    Confirm patient informs his/her Cancer Care team of any treatment received in a setting other than at the [facility] (such as inpatient or outpatient at another hospital or clinic, locally or out of state) so that this medical information can accurately be reflected in his/her medical record.  This vital information will provide an accurate picture for the physician prescribing the current cancer treatment. Yes  Start date of treatment: TBD   How to take your medication(s):  Swallow each tablet whole.  Do not break, crush, or chew, Take with food, and Avoid grapefruit juice    Plan for appointments and lab testing: Prior to each cycle     Missed Dose Management:   If you miss a dose, call your doctor for further instructions.  If you vomit or throw up your medication, call your doctor for further instructions.  Do not take 2 doses at the same time to make up for a missed dose.      Drug / Drug or Drug / Food Interactions:  Inform MD before starting any new medications, herbal  supplements, or vitamins    Cancer Treatment Side Effects (refer to Chemo Care Handouts for further information): Allergic reactions  Constipation  Diarrhea  Eye disorders  Fatigue  Hair loss  Heart effects  Kidney / Bladder effects  Liver effects  Loss of appetite  Low red blood cell count / Anemia  Low White Blood Cell Count/Risk of infection  Low Platelet Count/Risk of Bleeding  Lung Effects  Mouth or Throat Sores  Muscle / Bone Effects  Nausea / Vomiting  Nerve Effects  Reproductive / Fertility Effects  Sexual Effects  Skin Effects  Taste Changes    IV administration risks:  Potential leaking of drug outside of vein during administration   Signs/symptoms include redness, swelling, pain, burning at the site of administration  Notify Infusion Nurse immediately if any of these symptoms occur during or after the infusion  Allergic reaction: There is a chance for allergic reaction with some medications.  If your prescribed therapy has a higher risk for this, steps will be taken to prevent and minimize this from occurring.    Recommended anti-nausea medications (as directed by your provider):   Prochloperazine (Compazine) 10 mg every 6 hours and Ondansetron (Zofran) 8 mg every 8 hours  Take as needed    Helpful hints during cancer treatment:    Diet:  Avoid greasy or spicy foods on days surrounding chemotherapy  Eat small frequent meals per day (6-7 meals) rather than 3 large meals  Choose high calorie/high protein foods (chicken, hard cooked eggs, peanut butter, cheese)  If nauseated, try dry foods, such as toast, crackers or pretzels; light or bland foods, such as applesauce or oatmeal.    Fluid intake:  Drink 8-10 cups of liquid a day and take a water bottle wherever you go.  Any fluid is acceptable, but caffeinated products do not count towards your intake and should be limited to 1-2 drinks/day.    Physical Activity  If your doctor approves, be as physically active as you can, but start out slowly, and increase  your activity over time as you feel stronger.  Listen to your body and rest when you need to.  Do what you can when you feel up to it.      Oral Care  Keep your mouth clean.  Rinse your mouth before and after meals with plain water or with a mild mouth rinse (made with 1 quart water, 1 teaspoon salt, and 1 teaspoon baking soda, shake before using)   Avoid commercial mouthwashes that contain alcohol, alcoholic or acidic drinks or tobacco  An acceptable mouthwash is Biotene®   If soreness or sores develop, contact the office.    Diarrhea or Constipation  Imodium A-D use for diarrhea:  Take 2 tabs (4mg) at the first sign of diarrhea; then take 1 tab (2mg) every 2 hours until you have had no diarrhea for at least 12 hours; during the night take 2 tabs (4mg) every 4 hours as needed.  Maximum of 8 tablets in 24 hours.    Constipation: Over-the-counter recommendations include: Senokot, Ducolax, Milk of Magnesia or Miralax (generics are ok)    Skin Care  Avoid direct sunlight.  Wear a broad-spectrum sunscreen with an SPF of 30 or higher on any skin exposed to the sun.  Re-apply every 2 hours if in the sun and after bathing or sweating.  For dry skin, use an alcohol-free lotion twice per day, especially after baths.  If scalp hair loss has occurred, protect the scalp from the sun by wearing a hat and use sunscreen.  Apply alcohol-free moisturizer as needed.    When to call the doctor:  Fever of 100.4 or greater or shaking chills  Nausea/vomiting not controlled with anti-nausea medications: Unable to drink for 24 hours or have signs of dehydration: tiredness, thirst, dry mouth, dark and decreased amount of urine  Diarrhea - not controlled with Imodium AD or more than 6 episodes in 24 hours  Constipation -no bowel movement x 3 days, no response to stool softeners or laxatives  Mouth sores, sore throat or blisters on the lips affecting oral intake  Difficulty breathing, chest pain, or new cough  Excessive tiredness or weakness,  confusion or loss of balance  New rash  Tingling or burning, redness, swelling of the palms of hands or soles of feet  Sudden new unexpected symptom -such as change in vision, swelling in arm or leg  Increase in numbness and tingling of hands or feet  Unusual bleeding (nose bleeds, blood in urine, stool or phlegm)  Pain with urination  Persistent mood changes, depression, nervousness, difficulty sleeping   Pain, redness, swelling or blistering at the IV site  If you go to Emergency Room for any reason or seek medical attention elsewhere  If you should need to cancel or reschedule any visit, it is important that you contact the office    What Phone Number to Call:   [facility] [phone] / Triage Nurse    Teaching Materials Provided:   Chemo Care chemotherapy information sheets     Please refer to the following link if you are interested in additional information about chemotherapy for yourself or family members: [email or web address]/acs/cancer-[email or web address]      Safety and Handling of Chemotherapy  While you or your family member is receiving chemotherapy, whether in the clinic or at home, the following precautions must be taken to lessen any exposure to the medication.     Handling Body Waste:   Caregivers must wear gloves if exposed to the patient’s blood, urine, stool or vomit.  Dispose of the used gloves after each use and wash hands with soap and water.  Any sheets or clothes soiled with the bodily fluids should be machine washed twice in hot water with regular laundry detergent.  Do not wash soiled garments with hands.  If the soiled garments cannot be washed right away, place them in a sealed plastic bag until they can be washed.   Absorbable undergarments, or any other items contaminated with chemotherapy, should be placed in a sealed plastic bag for disposal, separate from other trash.  Toilets should be flushed twice with the lid closed while taking this medication and for 48 hours after the  last dose.  Wash your hands after using the toilet.  Wash any skin area that has come in contact with urine or stool.    Safety for my family and friends:  Due to safety concerns and the nature of this treatment environment, children are not permitted in the infusion center.  Please make appropriate arrangements.  Hugging and kissing is safe for you and your partner or family members.  You can visit, sit with, hug and kiss the children in your life.    You can be around pregnant women, though (if possible) they should not clean up any of your body fluids after you have treatment.   Sexual activity is safe while throughout treatment.  It is possible that traces of the oral chemotherapy may be present in vaginal fluid or semen for 48 hours after taking.  A condom should be used during this time.  Effective birth control should be used throughout treatment to prevent pregnancy while on these medications and for several months or years after therapy.  Chemotherapy can have harmful side effects to the fetus, especially in the first trimester.  In addition, menstrual cycles can become irregular during and after treatment, so you may not know if you are at a time in your cycle when you could become pregnant or if you are actually pregnant.    Handling oral medication:    Confirm that the medication is appropriately labeled.  Only patients who need chemotherapy should take or touch the medication.    If caregivers are involved, caregivers should wear gloves when administering the medication to protect against exposure.  Discard used gloves immediately - do not use for anything else.  Wash hands thoroughly before and after contact with this medication.  Women of child bearing age, pregnant women or women who are nursing should not handle this medication or any contaminated waste.     If the medication is provided in a blister pack, care should be taken when opening the packet to avoid breathing in any powder that may be  aerosolized.   Do not crush, break or open any pills or capsules unless instructed by your doctor.  Do not chew tablets.      Storage:   Store medication in sealed containers, out of reach of children and pets.  Do not store medication in the bathroom, as high humidity may damage the medication.    Check the medication label to confirm if medication should be stored in the refrigerator or away from light.  Store the medicine container inside another container or in a sealed bag.  Make sure it does not touch any food.    The medication should remain in its original packaging until it is ready for ingestion.  Pill containers can be used but should not be used for other medications, and as few people as possible should come in contact with it.  Empty pill containers should be washed with soap and water or disposed within a plastic bag.    Disposal:  The unused medication should not be flushed down the toilet or placed in the trash (preventing contamination of landfills and water supply).  Please contact your local police department for collection of unused prescription medications.      Cancer treatment education, including treatment plan, supportive medications, and post-treatment care was provided to the patient.  The patient/support person  was attentive during education, verbalized understanding, all questions were answered and patient was instructed to call with further questions.     Reinforcement of education is needed at next visit? Yes  75 minute appointment spent on education and counseling on above plan along with supportive care

## 2025-01-10 NOTE — PATIENT INSTRUCTIONS
Medication Education Record:  IV/Oral Cancer    Learner:  Patient    Barriers / Limitations:  None    Psychosocial Assessment:  patient psychosocial response appropriate    Diagnosis:   Prostate Cancer    Readiness of the patient to learn and adhere to oral cancer treatment outside of the cancer center:  Yes    IV Cancer Treatment Name(s): Docetaxel  IV Cancer Treatment Frequency Every 21 days     Number of cycles planned Up to 6 cycles based on tolerance and response    Oral Cancer Treatment Medication(s):  Medication Name Dose/Strength Frequency   Darolutamide Take 600mg by mouth twice daily  Daily with food   Prednisone 10mg Take with darolutamide        Schedule of oral medication(s):Daily, with food    Verified Consent to Chemotherapy/Biotherapy Cancer Treatment:  form signed by patient and provider:  Yes    RN/PA Verified prescription bottle against physician order: no    Confirm patient informs his/her Cancer Care team of any treatment received in a setting other than at the [facility] (such as inpatient or outpatient at another hospital or clinic, locally or out of state) so that this medical information can accurately be reflected in his/her medical record.  This vital information will provide an accurate picture for the physician prescribing the current cancer treatment. Yes  Start date of treatment: TBD   How to take your medication(s):  Swallow each tablet whole.  Do not break, crush, or chew, Take with food, and Avoid grapefruit juice    Plan for appointments and lab testing: Prior to each cycle     Missed Dose Management:   If you miss a dose, call your doctor for further instructions.  If you vomit or throw up your medication, call your doctor for further instructions.  Do not take 2 doses at the same time to make up for a missed dose.      Drug / Drug or Drug / Food Interactions:  Inform MD before starting any new medications, herbal supplements, or vitamins    Cancer Treatment  Side Effects (refer to Chemo Care Handouts for further information): Allergic reactions  Constipation  Diarrhea  Eye disorders  Fatigue  Hair loss  Heart effects  Kidney / Bladder effects  Liver effects  Loss of appetite  Low red blood cell count / Anemia  Low White Blood Cell Count/Risk of infection  Low Platelet Count/Risk of Bleeding  Lung Effects  Mouth or Throat Sores  Muscle / Bone Effects  Nausea / Vomiting  Nerve Effects  Reproductive / Fertility Effects  Sexual Effects  Skin Effects  Taste Changes    IV administration risks:  Potential leaking of drug outside of vein during administration   Signs/symptoms include redness, swelling, pain, burning at the site of administration  Notify Infusion Nurse immediately if any of these symptoms occur during or after the infusion  Allergic reaction: There is a chance for allergic reaction with some medications.  If your prescribed therapy has a higher risk for this, steps will be taken to prevent and minimize this from occurring.    Recommended anti-nausea medications (as directed by your provider):   Prochloperazine (Compazine) 10 mg every 6 hours and Ondansetron (Zofran) 8 mg every 8 hours  Take as needed    Helpful hints during cancer treatment:    Diet:  Avoid greasy or spicy foods on days surrounding chemotherapy  Eat small frequent meals per day (6-7 meals) rather than 3 large meals  Choose high calorie/high protein foods (chicken, hard cooked eggs, peanut butter, cheese)  If nauseated, try dry foods, such as toast, crackers or pretzels; light or bland foods, such as applesauce or oatmeal.    Fluid intake:  Drink 8-10 cups of liquid a day and take a water bottle wherever you go.  Any fluid is acceptable, but caffeinated products do not count towards your intake and should be limited to 1-2 drinks/day.    Physical Activity  If your doctor approves, be as physically active as you can, but start out slowly, and increase your activity over time as you feel stronger.   Listen to your body and rest when you need to.  Do what you can when you feel up to it.      Oral Care  Keep your mouth clean.  Rinse your mouth before and after meals with plain water or with a mild mouth rinse (made with 1 quart water, 1 teaspoon salt, and 1 teaspoon baking soda, shake before using)   Avoid commercial mouthwashes that contain alcohol, alcoholic or acidic drinks or tobacco  An acceptable mouthwash is Biotene®   If soreness or sores develop, contact the office.    Diarrhea or Constipation  Imodium A-D use for diarrhea:  Take 2 tabs (4mg) at the first sign of diarrhea; then take 1 tab (2mg) every 2 hours until you have had no diarrhea for at least 12 hours; during the night take 2 tabs (4mg) every 4 hours as needed.  Maximum of 8 tablets in 24 hours.    Constipation: Over-the-counter recommendations include: Senokot, Ducolax, Milk of Magnesia or Miralax (generics are ok)    Skin Care  Avoid direct sunlight.  Wear a broad-spectrum sunscreen with an SPF of 30 or higher on any skin exposed to the sun.  Re-apply every 2 hours if in the sun and after bathing or sweating.  For dry skin, use an alcohol-free lotion twice per day, especially after baths.  If scalp hair loss has occurred, protect the scalp from the sun by wearing a hat and use sunscreen.  Apply alcohol-free moisturizer as needed.    When to call the doctor:  Fever of 100.4 or greater or shaking chills  Nausea/vomiting not controlled with anti-nausea medications: Unable to drink for 24 hours or have signs of dehydration: tiredness, thirst, dry mouth, dark and decreased amount of urine  Diarrhea - not controlled with Imodium AD or more than 6 episodes in 24 hours  Constipation -no bowel movement x 3 days, no response to stool softeners or laxatives  Mouth sores, sore throat or blisters on the lips affecting oral intake  Difficulty breathing, chest pain, or new cough  Excessive tiredness or weakness, confusion or loss of balance  New  rash  Tingling or burning, redness, swelling of the palms of hands or soles of feet  Sudden new unexpected symptom -such as change in vision, swelling in arm or leg  Increase in numbness and tingling of hands or feet  Unusual bleeding (nose bleeds, blood in urine, stool or phlegm)  Pain with urination  Persistent mood changes, depression, nervousness, difficulty sleeping   Pain, redness, swelling or blistering at the IV site  If you go to Emergency Room for any reason or seek medical attention elsewhere  If you should need to cancel or reschedule any visit, it is important that you contact the office    What Phone Number to Call:   [facility] [phone] / Triage Nurse    Teaching Materials Provided:   Chemo Care chemotherapy information sheets     Please refer to the following link if you are interested in additional information about chemotherapy for yourself or family members: [email or web address]      Safety and Handling of Chemotherapy  While you or your family member is receiving chemotherapy, whether in the clinic or at home, the following precautions must be taken to lessen any exposure to the medication.     Handling Body Waste:   Caregivers must wear gloves if exposed to the patient’s blood, urine, stool or vomit.  Dispose of the used gloves after each use and wash hands with soap and water.  Any sheets or clothes soiled with the bodily fluids should be machine washed twice in hot water with regular laundry detergent.  Do not wash soiled garments with hands.  If the soiled garments cannot be washed right away, place them in a sealed plastic bag until they can be washed.   Absorbable undergarments, or any other items contaminated with chemotherapy, should be placed in a sealed plastic bag for disposal, separate from other trash.  Toilets should be flushed twice with the lid closed while taking this medication and for 48 hours after the last dose.  Wash your hands after  using the toilet.  Wash any skin area that has come in contact with urine or stool.    Safety for my family and friends:  Due to safety concerns and the nature of this treatment environment, children are not permitted in the infusion center.  Please make appropriate arrangements.  Hugging and kissing is safe for you and your partner or family members.  You can visit, sit with, hug and kiss the children in your life.    You can be around pregnant women, though (if possible) they should not clean up any of your body fluids after you have treatment.   Sexual activity is safe while throughout treatment.  It is possible that traces of the oral chemotherapy may be present in vaginal fluid or semen for 48 hours after taking.  A condom should be used during this time.  Effective birth control should be used throughout treatment to prevent pregnancy while on these medications and for several months or years after therapy.  Chemotherapy can have harmful side effects to the fetus, especially in the first trimester.  In addition, menstrual cycles can become irregular during and after treatment, so you may not know if you are at a time in your cycle when you could become pregnant or if you are actually pregnant.    Handling oral medication:    Confirm that the medication is appropriately labeled.  Only patients who need chemotherapy should take or touch the medication.    If caregivers are involved, caregivers should wear gloves when administering the medication to protect against exposure.  Discard used gloves immediately - do not use for anything else.  Wash hands thoroughly before and after contact with this medication.  Women of child bearing age, pregnant women or women who are nursing should not handle this medication or any contaminated waste.     If the medication is provided in a blister pack, care should be taken when opening the packet to avoid breathing in any powder that may be aerosolized.   Do not crush, break or  open any pills or capsules unless instructed by your doctor.  Do not chew tablets.      Storage:   Store medication in sealed containers, out of reach of children and pets.  Do not store medication in the bathroom, as high humidity may damage the medication.    Check the medication label to confirm if medication should be stored in the refrigerator or away from light.  Store the medicine container inside another container or in a sealed bag.  Make sure it does not touch any food.    The medication should remain in its original packaging until it is ready for ingestion.  Pill containers can be used but should not be used for other medications, and as few people as possible should come in contact with it.  Empty pill containers should be washed with soap and water or disposed within a plastic bag.    Disposal:  The unused medication should not be flushed down the toilet or placed in the trash (preventing contamination of landfills and water supply).  Please contact your local police department for collection of unused prescription medications.

## 2025-01-13 ENCOUNTER — TELEPHONE (OUTPATIENT)
Age: 70
End: 2025-01-13

## 2025-01-13 ENCOUNTER — TELEPHONE (OUTPATIENT)
Dept: HEMATOLOGY/ONCOLOGY | Facility: HOSPITAL | Age: 70
End: 2025-01-13

## 2025-01-13 NOTE — TELEPHONE ENCOUNTER
Called Sandy back regarding Darolutamide. Filled out [employer] patient assistance forms and instructed her that on Wednesday patient can sign the forms and he needs to bring his 1040 Tax return from last filled tax return to submit for free drug. Sandy voiced understanding and thanked me for the call.

## 2025-01-13 NOTE — TELEPHONE ENCOUNTER
Patient has been scheduled for first chemo but patient daughter is requesting a call in regards to the oral medication he is to be getting.  Please call patients daughter regarding this at [phone]

## 2025-01-15 ENCOUNTER — SOCIAL WORK SERVICES (OUTPATIENT)
Age: 70
End: 2025-01-15

## 2025-01-15 ENCOUNTER — APPOINTMENT (OUTPATIENT)
Age: 70
End: 2025-01-15
Attending: INTERNAL MEDICINE
Payer: MEDICARE

## 2025-01-15 ENCOUNTER — LAB REQUISITION (OUTPATIENT)
Dept: LAB | Facility: HOSPITAL | Age: 70
End: 2025-01-15
Payer: MEDICARE

## 2025-01-15 ENCOUNTER — NURSE ONLY (OUTPATIENT)
Age: 70
End: 2025-01-15
Attending: INTERNAL MEDICINE
Payer: MEDICARE

## 2025-01-15 DIAGNOSIS — C61 PROSTATE CANCER (HCC): ICD-10-CM

## 2025-01-15 DIAGNOSIS — C61 MALIGNANT NEOPLASM OF PROSTATE (HCC): ICD-10-CM

## 2025-01-15 DIAGNOSIS — C79.51 METASTASIS TO BONE (HCC): Primary | ICD-10-CM

## 2025-01-15 LAB
ALBUMIN SERPL-MCNC: 4.5 G/DL (ref 3.2–4.8)
ALBUMIN/GLOB SERPL: 1.4 {RATIO} (ref 1–2)
ALP LIVER SERPL-CCNC: 154 U/L
ALT SERPL-CCNC: 30 U/L
ANION GAP SERPL CALC-SCNC: 12 MMOL/L (ref 0–18)
AST SERPL-CCNC: 27 U/L (ref ?–34)
BASOPHILS # BLD AUTO: 0.04 X10(3) UL (ref 0–0.2)
BASOPHILS NFR BLD AUTO: 0.4 %
BILIRUB SERPL-MCNC: 0.6 MG/DL (ref 0.2–1.1)
BUN BLD-MCNC: 27 MG/DL (ref 9–23)
BUN/CREAT SERPL: 21.1 (ref 10–20)
CALCIUM BLD-MCNC: 9.7 MG/DL (ref 8.7–10.4)
CHLORIDE SERPL-SCNC: 108 MMOL/L (ref 98–112)
CO2 SERPL-SCNC: 24 MMOL/L (ref 21–32)
CREAT BLD-MCNC: 1.28 MG/DL
DEPRECATED RDW RBC AUTO: 41.2 FL (ref 35.1–46.3)
EGFRCR SERPLBLD CKD-EPI 2021: 61 ML/MIN/1.73M2 (ref 60–?)
EOSINOPHIL # BLD AUTO: 0.31 X10(3) UL (ref 0–0.7)
EOSINOPHIL NFR BLD AUTO: 3.4 %
ERYTHROCYTE [DISTWIDTH] IN BLOOD BY AUTOMATED COUNT: 12.3 % (ref 11–15)
GLOBULIN PLAS-MCNC: 3.2 G/DL (ref 2–3.5)
GLUCOSE BLD-MCNC: 170 MG/DL (ref 70–99)
HCT VFR BLD AUTO: 42.4 %
HGB BLD-MCNC: 15.2 G/DL
IMM GRANULOCYTES # BLD AUTO: 0.03 X10(3) UL (ref 0–1)
IMM GRANULOCYTES NFR BLD: 0.3 %
LYMPHOCYTES # BLD AUTO: 2.26 X10(3) UL (ref 1–4)
LYMPHOCYTES NFR BLD AUTO: 25.1 %
MCH RBC QN AUTO: 32.8 PG (ref 26–34)
MCHC RBC AUTO-ENTMCNC: 35.8 G/DL (ref 31–37)
MCV RBC AUTO: 91.6 FL
MONOCYTES # BLD AUTO: 0.83 X10(3) UL (ref 0.1–1)
MONOCYTES NFR BLD AUTO: 9.2 %
NEUTROPHILS # BLD AUTO: 5.52 X10 (3) UL (ref 1.5–7.7)
NEUTROPHILS # BLD AUTO: 5.52 X10(3) UL (ref 1.5–7.7)
NEUTROPHILS NFR BLD AUTO: 61.6 %
OSMOLALITY SERPL CALC.SUM OF ELEC: 307 MOSM/KG (ref 275–295)
PLATELET # BLD AUTO: 203 10(3)UL (ref 150–450)
POTASSIUM SERPL-SCNC: 4.4 MMOL/L (ref 3.5–5.1)
PROT SERPL-MCNC: 7.7 G/DL (ref 5.7–8.2)
PSA SERPL-MCNC: 3.02 NG/ML (ref ?–4)
RBC # BLD AUTO: 4.63 X10(6)UL
SODIUM SERPL-SCNC: 144 MMOL/L (ref 136–145)
TESTOST SERPL-MCNC: 14.78 NG/DL
WBC # BLD AUTO: 9 X10(3) UL (ref 4–11)

## 2025-01-15 PROCEDURE — 88363 XM ARCHIVE TISSUE MOLEC ANAL: CPT | Performed by: PATHOLOGY

## 2025-01-15 RX ORDER — ABIRATERONE ACETATE 250 MG/1
250 TABLET ORAL DAILY
Qty: 30 TABLET | Refills: 6 | Status: SHIPPED | OUTPATIENT
Start: 2025-01-15 | End: 2025-01-17

## 2025-01-15 RX ORDER — PREDNISONE 10 MG/1
10 TABLET ORAL ONCE
Status: CANCELLED
Start: 2025-01-15 | End: 2025-01-15

## 2025-01-15 RX ORDER — PREDNISONE 10 MG/1
10 TABLET ORAL ONCE
Status: COMPLETED | OUTPATIENT
Start: 2025-01-15 | End: 2025-01-15

## 2025-01-15 RX ADMIN — PREDNISONE 10 MG: 10 TABLET ORAL at 13:13:00

## 2025-01-15 NOTE — PROGRESS NOTES
SW completed an assessment with pt to provide support and encouragement due to new chemo starting today.      Pt is a 68 y/o man who lives in [place], [place] with his spouse.     Pt has 6 children     Patient is employed as a      Social determinants of health assessment completed with pt and no needs identified at this time.     Pt presents with normal affect and mood.Patient did not identify any feelings of anxiety about starting chemo today.     SW reviewed cancer support resources provided by [employer]. SW provided a pack of resources including information on transportation assistance, homecare/home health agencies and counseling resources. SW reviewed Advance Directives and importance of completion. SW explained role of SW in Cancer Center and provided Bringing [employer] gift bag. SW contact information given. Coping skills reviewed and support services encouraged due to new cancer dx.

## 2025-01-15 NOTE — PATIENT INSTRUCTIONS
Please see a dentist for dental clearance in order to get next Zometa dose.  Please let dentist know you received Zometa 3mg IV on 1/15/25 and we need his/her approval to continue with that medication.

## 2025-01-15 NOTE — PROGRESS NOTES
Pt here for C1D1 Taxotere and quarterly zometa.  Arrives Ambulating independently, accompanied by Family member     Patient was evaluated today by Treatment Nurse.    Oral medications included in this regimen:  yes - prednisone    Patient confirms comprehension of cancer treatment schedule:  yes    Pregnancy screening:  Not applicable    Modifications in dose or schedule:  No    Medications appearance and physical integrity checked by RN: yes.    Chemotherapy IV pump settings verified by 2 RNs:  Yes.  Frequency of blood return and site check throughout administration: Prior to administration, Prior to each drug, and At completion of therapy     Infusion/treatment outcome:  patient tolerated treatment without incident    Pt received first zometa today.  He did report a couple of loose teeth that he avoids chewing on.  Dr Pompa notified and per Chhaya zuñiga to proceed with todays dose. Pt instructed to see a dentist prior to next dose for dental clearance.  Pt understands that he should begin daily prednisone tomorrow. He received todays dose here with his tx.     Education Record    Learner:  Patient and Family Member  Barriers / Limitations:  Language Speaks English but Togolese is primary  Method:  Discussion  Education / instructions given:  reinforced chemo education  Outcome:  Shows understanding    Discharged Home, Ambulating independently, accompanied by:Family member    Patient/family verbalized understanding of future appointments: by printed AVS

## 2025-01-16 ENCOUNTER — TELEPHONE (OUTPATIENT)
Age: 70
End: 2025-01-16

## 2025-01-16 NOTE — TELEPHONE ENCOUNTER
Toxicities: C1 D1 Docetaxel/Daralutamide on 1/15/2025    : Maye # [id number]    Sandy reports Olayinka is feeling \"good.\" Olayinka did report to Sandy his cheeks were itchy. It then resolved. No itching this morning. I encouraged her to please call the office if Olayinka is not feeling well or they have any questions or concerns. She agreed and thanked me for checking on him.

## 2025-01-17 ENCOUNTER — TELEPHONE (OUTPATIENT)
Age: 70
End: 2025-01-17

## 2025-01-17 RX ORDER — ABIRATERONE ACETATE 250 MG/1
250 TABLET ORAL DAILY
Qty: 30 TABLET | Refills: 6 | Status: SHIPPED | OUTPATIENT
Start: 2025-01-17

## 2025-01-17 NOTE — TELEPHONE ENCOUNTER
Called Sandy to see if she had gotten an email from [facility] regarding ordering Abiraterone. She has not gotten any emails. Instructed her I would reach out to [facility]. Prescription was e-prescribed to [facility] 1/17 1425 as the prescription had not been received yet. She should be receiving an email once it is processed for her to schedule delivery. She voice understanding and thanked me for the call. Instructed her I would touch base with her on Monday to see if she heard anything.

## 2025-01-20 ENCOUNTER — TELEPHONE (OUTPATIENT)
Age: 70
End: 2025-01-20

## 2025-01-20 NOTE — TELEPHONE ENCOUNTER
Called Sandy to confirm she was able to order the Abiraterone from [facility]. She was able to order it and it will be arriving in about 5 days. Sandy said that Olayinka is feeling a little tired in the morning and asked if it was from the Prednisone. Discussed that it was probably due to the fact he was about 5 days from his Chemotherapy cycle and he should be getting energy back in a couple of days. Discussed the need to take the Prednisone in the AM with food. She voiced understanding and thanked me for the call.

## 2025-01-22 ENCOUNTER — TELEPHONE (OUTPATIENT)
Dept: SURGERY | Facility: CLINIC | Age: 70
End: 2025-01-22

## 2025-01-22 ENCOUNTER — TELEPHONE (OUTPATIENT)
Dept: HEMATOLOGY/ONCOLOGY | Facility: HOSPITAL | Age: 70
End: 2025-01-22

## 2025-01-22 NOTE — TELEPHONE ENCOUNTER
Called patient's daughter , verified name and  of her father Olayinka,  use for the phone call/ . Daughter says today the noted redness area on the penis, the area is not painful, no other urinary symptoms. Daughter requesting an appointment, next available appointment scheduled for this week 2024.   Daugther agreed, verbalized understandings and has no further questions.

## 2025-01-22 NOTE — TELEPHONE ENCOUNTER
Daughter gustabo calling she said her father has redness on his penis no problem urinating just started today. She also says he has warts around the rectal area and when he wipes theres blood and painful. Not sleeping well back pain,headaches. Thank you Jessenia

## 2025-01-22 NOTE — TELEPHONE ENCOUNTER
1/15/25 - C1D1 - Taxotere/Zometa    Patient noted redness on top of penis, no  urinary issues or drainage or itching.  Has some rectal warts that were bleeding on tissue. Pain in calf/cramping in back of both legs in calf's but no swelling or redness noted.  Not sleeping very well.    Denies fever or chills, no sob or chest pain, denies n/v/d, but occasional constipation. Denies lightheadedness or dizziness.  No urinary complaints.    Instructed to monitor redness, keep rectal clean.Call  for any fevers.  Please advise.

## 2025-01-22 NOTE — TELEPHONE ENCOUNTER
Patient daughter calling stating patient went to chemo therapy  on 1/15 and after that notice redness on his penis  no problems with urinating , also daughter stated he has warts around the rectal area and when he wipes theres blood and painful. Also not sleeping well. No openings available with Dr Oconnor until March.Please advise      Serbian speaker

## 2025-01-24 ENCOUNTER — OFFICE VISIT (OUTPATIENT)
Dept: SURGERY | Facility: CLINIC | Age: 70
End: 2025-01-24

## 2025-01-24 ENCOUNTER — DOCUMENTATION ONLY (OUTPATIENT)
Age: 70
End: 2025-01-24

## 2025-01-24 DIAGNOSIS — K64.9 ACUTE HEMORRHOID: Primary | ICD-10-CM

## 2025-01-24 DIAGNOSIS — C61 PROSTATE CANCER (HCC): Primary | ICD-10-CM

## 2025-01-24 PROCEDURE — 99214 OFFICE O/P EST MOD 30 MIN: CPT | Performed by: UROLOGY

## 2025-01-24 RX ORDER — HYDROCORTISONE 25 MG/G
1 CREAM TOPICAL 2 TIMES DAILY
Qty: 28 G | Refills: 1 | Status: SHIPPED | OUTPATIENT
Start: 2025-01-24

## 2025-01-24 NOTE — PROGRESS NOTES
Olayinka Echevarria is a 69 year old male.    HPI:     Chief Complaint   Patient presents with    Penile     1/22/25 redness on the tip of the penis pt used a OTC then used a cream/gel OTC called \"tucks\" for the rectum. Pt feels the penis is better.        69-year-old male accompanied by his daughter in follow-up to a previous visit December 23, 2024.  Diagnosed recently with high risk metastatic prostate cancer.  He was seen by medical oncology and started last week on docetaxel along with abiraterone and prednisone.  2 days ago daughter states he had reddish discoloration of the penile shaft which has since resolved and some painful ulcerations around the anus.  The patient attributes the ulcerations to eating spicy foods.      HISTORY:  Past Medical History:    CVA (cerebral vascular accident) (HCC)    GERD (gastroesophageal reflux disease)    Prediabetes      No past surgical history on file.   No family history on file.   Social History:   Social History     Socioeconomic History    Marital status:    Tobacco Use    Smoking status: Never     Passive exposure: Never    Smokeless tobacco: Never   Vaping Use    Vaping status: Never Used   Substance and Sexual Activity    Alcohol use: Not Currently    Drug use: Never     Social Drivers of Health     Financial Resource Strain: Not at Risk (6/21/2024)    Received from [facility]    Financial Resource Strain     Financial Resource Strain: 1   Food Insecurity: Not at Risk (1/20/2025)    Received from [facility]    Food Insecurity     Food: 1   Transportation Needs: Not at Risk (1/20/2025)    Received from [facility]    Transportation Needs     Transportation: 1   Physical Activity: Not on File (5/3/2024)    Received from [facility]    Physical Activity     Physical Activity: 0   Stress: Not on File (5/3/2024)    Received from [facility]    Stress     Stress: 0   Social Connections: Not on File (9/4/2024)    Received from [facility]    Social Connections     Connectedness: 0   Housing  Stability: Not at Risk (2025)    [facility]    [facility] Stability     Housin        Medications (Active prior to today's visit):  Current Outpatient Medications   Medication Sig Dispense Refill    Abiraterone Acetate 250 MG Oral Tab Take 250 mg by mouth daily. Take with a low fat meal 30 tablet 6    prochlorperazine (COMPAZINE) 10 mg tablet Take 1 tablet (10 mg total) by mouth every 6 (six) hours as needed for Nausea. 30 tablet 3    ondansetron (ZOFRAN) 8 MG tablet Take 1 tablet (8 mg total) by mouth every 8 (eight) hours as needed for Nausea. 30 tablet 3    predniSONE 10 MG Oral Tab Take 1 tablet (10 mg total) by mouth daily. 30 tablet 5    cefdinir 300 MG Oral Cap Take 1 cap by mouth every 12 hrs on the day of your procedure. 3 capsule 0    ciprofloxacin 500 MG Oral Tab Take 1 tab by mouth every 12 hours on the day of your procedure. 3 tablet 0    aspirin 81 MG Oral Tab EC Take 1 tablet (81 mg total) by mouth daily.      atorvastatin 40 MG Oral Tab Take 1 tablet (40 mg total) by mouth nightly.      omeprazole 20 MG Oral Capsule Delayed Release 1 twenty minutes before meal Orally twice a day for 30 days      celecoxib 50 MG Oral Cap Take 1 capsule (50 mg total) by mouth daily as needed.         Allergies:  Allergies[1]      ROS:       PHYSICAL EXAM:   On physical exam he is got some erythematous ulcerations along the anus.  No condyloma toyed lesions are seen.  No hemorrhoids are detected.     ASSESSMENT/PLAN:   Assessment   Encounter Diagnosis   Name Primary?    Prostate cancer (HCC) Yes       Reviewed findings at length with patient and daughter.  Suspect some of the lesions may be related to side effects from his chemotherapy medications.  I told the patient that he should discuss this with his medical oncologist Dr. Pompa and the daughter will reach out to their office.  We agreed that they would follow-up with us otherwise on a as needed basis.  Medical oncology may assume treatment with  androgen deprivation therapy moving forward which is due March 20 or so as the patient has no active urologic issues at this time aside from metastatic prostate cancer.         Orders This Visit:  No orders of the defined types were placed in this encounter.      Meds This Visit:  Requested Prescriptions      No prescriptions requested or ordered in this encounter       Imaging & Referrals:  None     1/24/2025  Hung Oconnor MD               [1] No Known Allergies

## 2025-01-24 NOTE — PROGRESS NOTES
Spoke with Sandy at the . She states that her dad is having bleeding from rectum when he wipes and pain at Rectum. Asked her if he has had Hemorrhoids in the past and she said he has not. Spoke with Elisabeth NERI. She sent a script for Proctosol to the pharmacy, gave her instructions for use. She brought in patient's Abiraterone. Discussed that it should be taken daily with a low fat meal. She asked about the Prednisone if it needed to be taken at same time. I said no, but that the Prednisone should be taken with food as well. Instructed him to take Abiraterone at the same time every day.

## 2025-01-27 ENCOUNTER — TELEPHONE (OUTPATIENT)
Age: 70
End: 2025-01-27

## 2025-01-27 NOTE — TELEPHONE ENCOUNTER
Called Sandy to check on Olayinka to see if the hemorrhoid pain was better. Sandy said yes, the pain went away, no other issues and she thanked me for the call.

## 2025-02-05 ENCOUNTER — OFFICE VISIT (OUTPATIENT)
Age: 70
End: 2025-02-05
Attending: INTERNAL MEDICINE
Payer: MEDICARE

## 2025-02-05 ENCOUNTER — NURSE ONLY (OUTPATIENT)
Age: 70
End: 2025-02-05
Attending: INTERNAL MEDICINE
Payer: MEDICARE

## 2025-02-05 VITALS
DIASTOLIC BLOOD PRESSURE: 87 MMHG | HEART RATE: 76 BPM | BODY MASS INDEX: 30.08 KG/M2 | TEMPERATURE: 99 F | OXYGEN SATURATION: 96 % | WEIGHT: 178.38 LBS | SYSTOLIC BLOOD PRESSURE: 148 MMHG | RESPIRATION RATE: 18 BRPM | HEIGHT: 64.57 IN

## 2025-02-05 DIAGNOSIS — C79.51 METASTASIS TO BONE (HCC): Primary | ICD-10-CM

## 2025-02-05 DIAGNOSIS — C61 PROSTATE CANCER (HCC): ICD-10-CM

## 2025-02-05 DIAGNOSIS — R73.9 HYPERGLYCEMIA: ICD-10-CM

## 2025-02-05 DIAGNOSIS — C77.5 MALIGNANT NEOPLASM METASTATIC TO INTRAPELVIC LYMPH NODE (HCC): ICD-10-CM

## 2025-02-05 LAB
ALBUMIN SERPL-MCNC: 4.4 G/DL (ref 3.2–4.8)
ALBUMIN/GLOB SERPL: 1.6 {RATIO} (ref 1–2)
ALP LIVER SERPL-CCNC: 94 U/L
ALT SERPL-CCNC: 34 U/L
ANION GAP SERPL CALC-SCNC: 10 MMOL/L (ref 0–18)
AST SERPL-CCNC: 21 U/L (ref ?–34)
BASOPHILS # BLD AUTO: 0.05 X10(3) UL (ref 0–0.2)
BASOPHILS NFR BLD AUTO: 0.3 %
BILIRUB SERPL-MCNC: 0.6 MG/DL (ref 0.2–1.1)
BUN BLD-MCNC: 19 MG/DL (ref 9–23)
BUN/CREAT SERPL: 18.3 (ref 10–20)
CALCIUM BLD-MCNC: 8.9 MG/DL (ref 8.7–10.4)
CHLORIDE SERPL-SCNC: 102 MMOL/L (ref 98–112)
CO2 SERPL-SCNC: 26 MMOL/L (ref 21–32)
CREAT BLD-MCNC: 1.04 MG/DL
DEPRECATED RDW RBC AUTO: 42.5 FL (ref 35.1–46.3)
EGFRCR SERPLBLD CKD-EPI 2021: 78 ML/MIN/1.73M2 (ref 60–?)
EOSINOPHIL # BLD AUTO: 0 X10(3) UL (ref 0–0.7)
EOSINOPHIL NFR BLD AUTO: 0 %
ERYTHROCYTE [DISTWIDTH] IN BLOOD BY AUTOMATED COUNT: 12.7 % (ref 11–15)
EST. AVERAGE GLUCOSE BLD GHB EST-MCNC: 177 MG/DL (ref 68–126)
FASTING STATUS PATIENT QL REPORTED: NO
GLOBULIN PLAS-MCNC: 2.8 G/DL (ref 2–3.5)
GLUCOSE BLD-MCNC: 315 MG/DL (ref 70–99)
HBA1C MFR BLD: 7.8 % (ref ?–5.7)
HCT VFR BLD AUTO: 42.4 %
HGB BLD-MCNC: 14.3 G/DL
IMM GRANULOCYTES # BLD AUTO: 0.16 X10(3) UL (ref 0–1)
IMM GRANULOCYTES NFR BLD: 1 %
LYMPHOCYTES # BLD AUTO: 1.59 X10(3) UL (ref 1–4)
LYMPHOCYTES NFR BLD AUTO: 10.1 %
MCH RBC QN AUTO: 31.1 PG (ref 26–34)
MCHC RBC AUTO-ENTMCNC: 33.7 G/DL (ref 31–37)
MCV RBC AUTO: 92.2 FL
MONOCYTES # BLD AUTO: 0.63 X10(3) UL (ref 0.1–1)
MONOCYTES NFR BLD AUTO: 4 %
NEUTROPHILS # BLD AUTO: 13.3 X10 (3) UL (ref 1.5–7.7)
NEUTROPHILS # BLD AUTO: 13.3 X10(3) UL (ref 1.5–7.7)
NEUTROPHILS NFR BLD AUTO: 84.6 %
OSMOLALITY SERPL CALC.SUM OF ELEC: 300 MOSM/KG (ref 275–295)
PLATELET # BLD AUTO: 218 10(3)UL (ref 150–450)
POTASSIUM SERPL-SCNC: 4.4 MMOL/L (ref 3.5–5.1)
PROT SERPL-MCNC: 7.2 G/DL (ref 5.7–8.2)
PSA SERPL-MCNC: 0.86 NG/ML (ref ?–4)
RBC # BLD AUTO: 4.6 X10(6)UL
SODIUM SERPL-SCNC: 138 MMOL/L (ref 136–145)
TESTOST SERPL-MCNC: <7 NG/DL
WBC # BLD AUTO: 15.7 X10(3) UL (ref 4–11)

## 2025-02-05 RX ORDER — PREDNISONE 5 MG/1
5 TABLET ORAL DAILY
Qty: 30 TABLET | Refills: 3 | Status: SHIPPED | OUTPATIENT
Start: 2025-02-05

## 2025-02-05 NOTE — PROGRESS NOTES
[facility] Office Note     Patient Name: Olayinka Echevarria   YOB: 1955   Medical Record Number: [id number]   [facility]: [id number]   Consulting Physician: Thomas Pompa MD    Diagnosis  1. Metastasis to bone (HCC)    2. Prostate cancer (HCC)    3. 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 Hyperglycemia      Current Therapy  Docetaxel C2D1 - due 2/5/25  Rosie/Pred -started 1/2025  ADT - started 12/24  Zometa 3mg q3wks - started 1/2025    INTERVAL HISTORY  Patient returns for follow-up. Since the last visit he has done well.  He tolerated his first cycle of docetaxel without any major side effects.  He did have some skin rash and erythema around the perianal area that has improved with topical Anusol.  No nausea vomiting or febrile illness.  Appetite is good and his weight is stable.  Pt also denies any bleeding, specifically hematuria, hematemesis or hemoptysis.    Past Oncologic History   Olayinka Echevarria is a 69 year old male that [facility] for newly diagnosed de-kristin metastatic prostate cancer.  His oncologic history is detailed below    8/8/24 Screening PSA was elevated at 41. He has been asymptomatic previously    10/16/24 MRI of the prostate showed multiple highly suspicious PI-RADS 5 and 4 lesions in the right aspect the prostate gland with invasion of the right seminal vesicle.  Markedly enlarged right iliac chain lymph node and prominent left iliac chain lymph nodes were noted.    12/11/24 TRUS prostate biopsy showed multiple cores of Josefina grade group 3 [4+3] prostatic adenocarcinoma in 9 of 9 sampled cores.  There were 3 cores with Josefina grade group 5 [4+5] prostatic adenocarcinoma involving up to 75% of sampled tissue.    12/20/2024 PSMA PET scan showed PET avid foci within the prostate as well as seminal vesicle uptake.  Multiple PET avid lymph nodes within the pelvis retroperitoneum as well as the  retrocrural area was noted.  There was also enlarged nodes in the left clavicle chain.  Several PET avid bone lesions throughout the spine and ribs sternum left scapular and pelvis were noted.    He started androgen deprivation therapy with a dose of Eligard on 2024 and was referred here for discussion regarding systemic therapy.    2025 given high-volume metastatic disease he was recommended triple therapy with docetaxel as well as abiraterone and prednisone in addition to ADT.      Past Medical History:  Past Medical History:    CVA (cerebral vascular accident) (HCC)    GERD (gastroesophageal reflux disease)    Prediabetes       Past Surgical History:  No past surgical history on file.    Family Medical History:  No family history on file.        Social History:  Social History     Socioeconomic History    Marital status:      Spouse name: Not on file    Number of children: Not on file    Years of education: Not on file    Highest education level: Not on file   Occupational History    Not on file   Tobacco Use    Smoking status: Never     Passive exposure: Never    Smokeless tobacco: Never   Vaping Use    Vaping status: Never Used   Substance and Sexual Activity    Alcohol use: Not Currently    Drug use: Never    Sexual activity: Not on file   Other Topics Concern    Not on file   Social History Narrative    Not on file     Social Drivers of Health     Food Insecurity: Not at Risk (2025)    Received from [employer]    Food Insecurity     Food: 1   Transportation Needs: Not at Risk (2025)    Received from [employer]    Transportation Needs     Transportation: 1   Housing Stability: Not at Risk (2025)    Received from [employer]    Housing Stability     Housin       Allergies:   Allergies[1]    Current Medications:   predniSONE 5 MG Oral Tab Take 1 tablet (5 mg total) by mouth daily. 30 tablet 3    hydrocortisone (PROCTOSOL HC) 2.5 % External Cream Place 1 Application rectally 2 (two) times daily.  28 g 1    Abiraterone Acetate 250 MG Oral Tab Take 250 mg by mouth daily. Take with a low fat meal 30 tablet 6    prochlorperazine (COMPAZINE) 10 mg tablet Take 1 tablet (10 mg total) by mouth every 6 (six) hours as needed for Nausea. 30 tablet 3    ondansetron (ZOFRAN) 8 MG tablet Take 1 tablet (8 mg total) by mouth every 8 (eight) hours as needed for Nausea. 30 tablet 3    aspirin 81 MG Oral Tab EC Take 1 tablet (81 mg total) by mouth daily.      atorvastatin 40 MG Oral Tab Take 1 tablet (40 mg total) by mouth nightly.      celecoxib 50 MG Oral Cap Take 1 capsule (50 mg total) by mouth daily as needed.        leuprolide (Eligard 3 Month) SUBQ injection 22.5 mg  22.5 mg Subcutaneous Q3 Months    22.5 mg at 12/23/24 1323       Review of Systems:    A comprehensive 14 point review of systems was completed.  Pertinent positives and negatives noted in the the HPI.     Vital Signs:  /87 (BP Location: Left arm, Patient Position: Sitting, Cuff Size: adult)   Pulse 76   Temp 98.8 °F (37.1 °C) (Tympanic)   Resp 18   Ht 1.64 m (5' 4.57\")   Wt 80.9 kg (178 lb 6.4 oz)   SpO2 96%   BMI 30.08 kg/m²     Physical Examination:    General: Patient is alert and oriented x 3, not in acute distress.  HEENT: EOMs intact. PERRL. Oropharynx is clear.   Neck: No JVD. No palpable lymphadenopathy. Neck is supple.  Lymphatics: There is no palpable lymphadenopathy throughout in the cervical, supraclavicular or axillary regions.  Chest: Clear to auscultation. No wheezes or rales.  Heart: Regular rate and rhythm. S1S2 normal.  Extremities: No edema or calf tenderness.  Neurological: Grossly intact.     Performance Status:    ECOG-0    Labs:    Imaging:    Reviewed PSMA films personally and with pt/family    Pathology:    Reviewd prostate bx as above    Impression:      ICD-10-CM    1. Metastasis to bone (HCC)  C79.51 predniSONE 5 MG Oral Tab      2. Prostate cancer (HCC)  C61 predniSONE 5 MG Oral Tab      3. Malignant neoplasm  metastatic to intrapelvic lymph node (HCC)  C77.5       4. Hyperglycemia  R73.9         69-year-old male with de kristin metastatic high-grade [GG 5] prostate cancer with extensive high-volume bony and lymphatic mets.  I had a long discussion with the patient explained that unfortunately he has an incurable cancer that is however very treatable with androgen deprivation therapy/ARPI as well as chemotherapy.  We discussed that given his excellent performance status and the high grade/high-volume disease he would be a good candidate for triplet therapy.  We discussed the improved PFS and OS benefits seen with this approach    He and his family want to proceed with triplet treatment    Plan:  Continue docetaxel cycle 2-day 1 today.  Labs are unremarkable  Anticipated side effects of chemotherapy including asthenia myelosuppression alopecia etc. were discussed in detail  Continue zoledronate 3 mg every 3 weeks   Continue abiraterone 250mg with food (he has financial issues that preclude standard 1000mg dosing)  RTC for fu with next cycle of chemotherapy  Hyperglycemia: Secondary to prednisone.  Decrease the prednisone dose to 5 mg and monitor blood sugars.  Check A1c with next draw    Emotional Well Being:    Emotional Well Being discussed, patient aware of support systems available through the Cancer Center. No acute issues.     The diagnosis, prognosis, treatment goals, plan for treatment, and expected response was explained to the patient.       Thank you Dr Hung Oconnor MD for the opportunity to participate in the care of this interesting patient. Please do contact me if I may be of any further assistance    Thomas Pompa MD  [facility]     High complexity MDM. Our clinic is continuing focal point for all oncologic services the patient needs.         [1] No Known Allergies

## 2025-02-05 NOTE — PATIENT INSTRUCTIONS
Please make an appointment with a dentist for your loose teeth.  You are ok to stay on Zometa unless instructed by a Dentist.  Please inform your new dentist that you have been on Zometa (last dose 2/5).  Thank you

## 2025-02-05 NOTE — TELEPHONE ENCOUNTER
[facility] is calling to ask if patient still needs Nubeqa 300mg or if they should discontinue it. Please contact pharmacy: [phone].

## 2025-02-05 NOTE — PROGRESS NOTES
Pt here for C2D1 Drug(s)Taxotere and zometa q 3 weeks.  Arrives Ambulating independently, accompanied by Family member.  Ivorian speaking primarily-family member assisted with interpretation.  Pt due for zometa today as well, denies dental pain.  OK to proceed her Dr Pompa as pt is Currently attempting to find a new dentist for loose teeth.  Educated pt to inform dentist of zometa dosing.  States understanding.  Educated to push po fluids.  Doses given as ordered-tolerated well.  PIV from lab with good blood return.     Prednisone taken as directed.     Patient was evaluated today by MD and Treatment Nurse.    Oral medications included in this regimen: yes prednisone    Patient confirms comprehension of cancer treatment schedule:  yes    Pregnancy screening:  Not applicable    Modifications in dose or schedule:  No    Medications appearance and physical integrity checked by RN: yes.    Chemotherapy IV pump settings verified by 2 RNs:  Yes.  Frequency of blood return and site check throughout administration: Prior to administration, Prior to each drug, and At completion of therapy     Infusion/treatment outcome:  patient tolerated treatment without incident  PIV dc'd and AVS provided with scheduled out dates for family members schedule.   Education Record    Learner:  Patient  Barriers / Limitations:  None  Method:  Discussion and  utilized  Education / instructions given:  reviewed plan of care, reviewed zometa restrictions and appointment plan of care  Outcome:  Shows understanding    Discharged Home, Ambulating independently, accompanied by:Family member    Patient/family verbalized understanding of future appointments: by printed AVS

## 2025-02-10 ENCOUNTER — TELEPHONE (OUTPATIENT)
Age: 70
End: 2025-02-10

## 2025-02-10 NOTE — TELEPHONE ENCOUNTER
[facility] with [facility]  is calling to confirm if patient should be taking Nubeqa 300 mg tablets, received a refill request. Patient daughter informed her that he no longer takes this medication.     Please contact Pharmacy.

## 2025-02-26 NOTE — PROGRESS NOTES
[facility] Office Note     Patient Name: Olayinka Echevarria   YOB: 1955   Medical Record Number: [id number]   [facility]: [id number]   Consulting Physician: Thomas Pompa MD    Diagnosis  1. Prostate cancer (HCC)    2. Metastasis to bone (HCC)    3. 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 Since the last visit he has done well.  He tolerated his last cycle of docetaxel without any major side effects.  No recurrence of skin rash or leslie-anal erythema.  No nausea vomiting or febrile illness.  Appetite is good and his weight is stable.  Pt also denies any bleeding, specifically hematuria, hematemesis or hemoptysis.    Past Oncologic History   Olayinka Echevarria is a 69 year old male that was seen today in the Cancer Center for newly diagnosed de-kristin metastatic prostate cancer.  His oncologic history is detailed below    8/8/24 Screening PSA was elevated at 41. He has been asymptomatic previously    10/16/24 MRI of the prostate showed multiple highly suspicious PI-RADS 5 and 4 lesions in the right aspect the prostate gland with invasion of the right seminal vesicle.  Markedly enlarged right iliac chain lymph node and prominent left iliac chain lymph nodes were noted.    12/11/24 TRUS prostate biopsy showed multiple cores of [place] grade group 3 [4+3] prostatic adenocarcinoma in 9 of 9 sampled cores.  There were 3 cores with Josefina grade group 5 [4+5] prostatic adenocarcinoma involving up to 75% of sampled tissue.    12/20/2024 PSMA PET scan showed PET avid foci within the prostate as well as seminal vesicle uptake.  Multiple PET avid lymph nodes within the pelvis retroperitoneum as well as the retrocrural area was noted.  There was also  enlarged nodes in the left clavicle chain.  Several PET avid bone lesions throughout the spine and ribs sternum left scapular and pelvis were noted.    He started androgen deprivation therapy with a dose of Eligard on 2024 and was referred here for discussion regarding systemic therapy.    2025 given high-volume metastatic disease he was recommended triple therapy with docetaxel as well as abiraterone and prednisone in addition to ADT.      Past Medical History:  Past Medical History:    CVA (cerebral vascular accident) (HCC)    GERD (gastroesophageal reflux disease)    Prediabetes       Past Surgical History:  No past surgical history on file.    Family Medical History:  No family history on file.        Social History:  Social History     Socioeconomic History    Marital status:      Spouse name: Not on file    Number of children: Not on file    Years of education: Not on file    Highest education level: Not on file   Occupational History    Not on file   Tobacco Use    Smoking status: Never     Passive exposure: Never    Smokeless tobacco: Never   Vaping Use    Vaping status: Never Used   Substance and Sexual Activity    Alcohol use: Not Currently    Drug use: Never    Sexual activity: Not on file   Other Topics Concern    Not on file   Social History Narrative    Not on file     Social Drivers of Health     Food Insecurity: Not at Risk (2025)    Received from [employer]    Food Insecurity     Food: 1   Transportation Needs: Not at Risk (2025)    Received from [employer]    Transportation Needs     Transportation: 1   Housing Stability: Not at Risk (2025)    Received from [employer]    Housing Stability     Housin       Allergies:   Allergies[1]    Current Medications:   predniSONE 5 MG Oral Tab Take 1 tablet (5 mg total) by mouth daily. 30 tablet 3    hydrocortisone (PROCTOSOL HC) 2.5 % External Cream Place 1 Application rectally 2 (two) times daily. 28 g 1    Abiraterone Acetate 250 MG Oral  Tab Take 250 mg by mouth daily. Take with a low fat meal 30 tablet 6    prochlorperazine (COMPAZINE) 10 mg tablet Take 1 tablet (10 mg total) by mouth every 6 (six) hours as needed for Nausea. 30 tablet 3    ondansetron (ZOFRAN) 8 MG tablet Take 1 tablet (8 mg total) by mouth every 8 (eight) hours as needed for Nausea. 30 tablet 3    aspirin 81 MG Oral Tab EC Take 1 tablet (81 mg total) by mouth daily.      atorvastatin 40 MG Oral Tab Take 1 tablet (40 mg total) by mouth nightly.      celecoxib 50 MG Oral Cap Take 1 capsule (50 mg total) by mouth daily as needed.        leuprolide (Eligard 3 Month) SUBQ injection 22.5 mg  22.5 mg Subcutaneous Q3 Months    22.5 mg at 12/23/24 1323       Review of Systems:    A comprehensive 14 point review of systems was completed.  Pertinent positives and negatives noted in the the HPI.     Vital Signs:  BP (!) 153/92 (BP Location: Left arm, Patient Position: Sitting, Cuff Size: adult)   Pulse 91   Temp 98.3 °F (36.8 °C) (Oral)   Resp 18   Ht 1.638 m (5' 4.5\")   Wt 80.9 kg (178 lb 6.4 oz)   SpO2 95%   BMI 30.15 kg/m²     Physical Examination:    General: Patient is alert and oriented x 3, not in acute distress.  HEENT: EOMs intact. PERRL. Oropharynx is clear.   Neck: No JVD. No palpable lymphadenopathy. Neck is supple.  Lymphatics: There is no palpable lymphadenopathy throughout in the cervical, supraclavicular or axillary regions.  Chest: Clear to auscultation. No wheezes or rales.  Heart: Regular rate and rhythm. S1S2 normal.  Extremities: No edema or calf tenderness.  Neurological: Grossly intact.     Performance Status:    ECOG-0    Labs:    Imaging:    Reviewed PSMA films personally and with pt/family    Pathology:    Reviewd prostate bx as above    Impression:      ICD-10-CM    1. Prostate cancer (HCC)  C61       2. Metastasis to bone (HCC)  C79.51       3. 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  I had a long discussion with the patient explained that unfortunately he has an incurable cancer that is however very treatable with androgen deprivation therapy/ARPI as well as chemotherapy.  We discussed that given his excellent performance status and the high grade/high-volume disease he would be a good candidate for triplet therapy.  We discussed the improved PFS and OS benefits seen with this approach    He and his family want to proceed with triplet treatment    Plan:  Continue docetaxel cycle 3-day 1 today.  Labs are unremarkable  Anticipated side effects of chemotherapy including asthenia myelosuppression alopecia etc. were discussed in detail  Hold Jessica, pending dental evaluation  Continue abiraterone 250mg with food (he has financial issues that preclude standard 1000mg dosing)  RTC for fu with next cycle of chemotherapy  Hyperglycemia: Secondary to prednisone.  Better on prednisone 5, and montior    Emotional Well Being:    Emotional Well Being discussed, patient aware of support systems available through the Cancer Center. No acute issues.     The diagnosis, prognosis, treatment goals, plan for treatment, and expected response was explained to the patient.       Thank you Dr Hung Oconnor MD for the opportunity to participate in the care of this interesting patient. Please do contact me if I may be of any further assistance    Thomas Pompa MD  [facility]     High complexity MDM. Our clinic is continuing focal point for all oncologic services the patient needs.         [1] No Known Allergies

## 2025-02-26 NOTE — PROGRESS NOTES
Pt here for C3D1 Drug(s)Taxotere.  Arrives Ambulating independently, accompanied by Family member     Patient was evaluated today by MD and Treatment Nurse.    Holding Zometa today, per Chhaya ORR    R hand PIV started in lab, good blood return noted.    Oral medications included in this regimen:  yes - prednisone. Patient confirms taking steroid this morning.    Patient confirms comprehension of cancer treatment schedule:  yes    Pregnancy screening:  Not applicable    Modifications in dose or schedule:  No    Medications appearance and physical integrity checked by RN: yes.    Chemotherapy IV pump settings verified by 2 RNs:  Yes.  Frequency of blood return and site check throughout administration: Prior to administration, Prior to each drug, and At completion of therapy     Infusion/treatment outcome:  patient tolerated treatment without incident    PIV removed.    Education Record    Learner:  Patient and Family Member  Barriers / Limitations:  Language  Method:  Discussion and Printed material  Education / instructions given:  Medication, plan of care, schedule  Outcome:  Shows understanding    Discharged Home, Ambulating independently, accompanied by:Family member    Patient/family verbalized understanding of future appointments: by printed AVS

## 2025-03-19 NOTE — PROGRESS NOTES
Pt here for C4 D1 Taxol.  Arrives Ambulating independently, accompanied by Family member     Patient was evaluated today by MD.    Pt also receiving q3wks zometa. Pt denies any recent or planned invasive dental work. Educated pt about importance of notifying the dentist prior any work about his zometa infusion. Pt verbalized understanding.     Oral medications included in this regimen:  no    Patient confirms comprehension of cancer treatment schedule:  yes    Pregnancy screening:  Not applicable    Modifications in dose or schedule:  No    Medications appearance and physical integrity checked by RN: yes.    Chemotherapy IV pump settings verified by 2 RNs:  Yes.  Frequency of blood return and site check throughout administration: Prior to administration, Prior to each drug, and At completion of therapy     Infusion/treatment outcome:  patient tolerated treatment without incident    Education Record    Learner:  Patient and Family Member  Barriers / Limitations:  None  Method:  Discussion, Printed material, and Reinforcement  Education / instructions given:  Plan of care reviewed  Outcome:  Shows understanding    Discharged Home, Ambulating independently, accompanied by:Family member    Patient/family verbalized understanding of future appointments: by [employer] messaging,

## 2025-03-19 NOTE — PROGRESS NOTES
Pt called to cancelled her covid test for her appt on 03-02-22 because she tested positive within the 90 day window.   [facility] Office Note     Patient Name: Olayinka Echevarria   YOB: 1955   Medical Record Number: [id number]   [facility]: [id number]   Consulting Physician: Thomas Pompa MD    Diagnosis  1. Prostate cancer (HCC)    2. Metastasis to bone (HCC)    3. Chemotherapy management, encounter for    4. Other secondary hypertension      Current Therapy  Docetaxel C4D1 - due 3/19/25  Rosie/Pred -started 1/2025  ADT - started 12/24  Zometa 3mg q3wks - can resume now    INTERVAL HISTORY  Patient returns for follow-up. Since the last visit he has done well.  He tolerated his last cycle of docetaxel without any major side effects.  No recurrence of skin rash or leslie-anal erythema.  No nausea vomiting or febrile illness.  Appetite is good and his weight is stable.  Pt also denies any bleeding, specifically hematuria, hematemesis or hemoptysis. BP is more elevated today  Zometa had been on hold pending dental evaluation.  His oral surgeon recommended extraction of 8 teeth.  However he is not planning to have this done because he does not want dentures.    Past Oncologic History   Olayinka Echevarria is a 70 year old male that was [facility] in [facility] for newly diagnosed de-kristin metastatic prostate cancer.  His oncologic history is detailed below    8/8/24 Screening PSA was elevated at 41. He has been asymptomatic previously    10/16/24 MRI of the prostate showed multiple highly suspicious PI-RADS 5 and 4 lesions in the right aspect the prostate gland with invasion of the right seminal vesicle.  Markedly enlarged right iliac chain lymph node and prominent left iliac chain lymph nodes were noted.    12/11/24 TRUS prostate biopsy showed multiple cores of [place] grade group 3 [4+3] prostatic adenocarcinoma in 9 of 9 sampled cores.  There were 3 cores with [place] grade group 5 [4+5] prostatic adenocarcinoma involving up to 75% of sampled tissue.    12/20/2024 PSMA PET scan showed PET avid  foci within the prostate as well as seminal vesicle uptake.  Multiple PET avid lymph nodes within the pelvis retroperitoneum as well as the retrocrural area was noted.  There was also enlarged nodes in the left clavicle chain.  Several PET avid bone lesions throughout the spine and ribs sternum left scapular and pelvis were noted.    He started androgen deprivation therapy with a dose of Eligard on 2024 and was referred here for discussion regarding systemic therapy.    2025 given high-volume metastatic disease he was recommended triple therapy with docetaxel as well as abiraterone and prednisone in addition to ADT.      Past Medical History:  Past Medical History:    CVA (cerebral vascular accident) (HCC)    GERD (gastroesophageal reflux disease)    Prediabetes       Past Surgical History:  No past surgical history on file.    Family Medical History:  No family history on file.        Social History:  Social History     Socioeconomic History    Marital status:      Spouse name: Not on file    Number of children: Not on file    Years of education: Not on file    Highest education level: Not on file   Occupational History    Not on file   Tobacco Use    Smoking status: Never     Passive exposure: Never    Smokeless tobacco: Never   Vaping Use    Vaping status: Never Used   Substance and Sexual Activity    Alcohol use: Not Currently    Drug use: Never    Sexual activity: Not on file   Other Topics Concern    Not on file   Social History Narrative    Not on file     Social Drivers of Health     Food Insecurity: Not at Risk (2025)    Received from [employer]    Food Insecurity     Food: 1   Transportation Needs: Not at Risk (2025)    Received from [employer]    Transportation Needs     Transportation: 1   Housing Stability: Not at Risk (2025)    Received from [employer]    Housing Stability     Housin       Allergies:   Allergies[1]    Current Medications:   hydroCHLOROthiazide 12.5 MG Oral Cap Take 1  capsule (12.5 mg total) by mouth daily. 30 capsule 6    predniSONE 5 MG Oral Tab Take 1 tablet (5 mg total) by mouth daily. 30 tablet 3    hydrocortisone (PROCTOSOL HC) 2.5 % External Cream Place 1 Application rectally 2 (two) times daily. 28 g 1    Abiraterone Acetate 250 MG Oral Tab Take 250 mg by mouth daily. Take with a low fat meal 30 tablet 6    aspirin 81 MG Oral Tab EC Take 1 tablet (81 mg total) by mouth daily.      atorvastatin 40 MG Oral Tab Take 1 tablet (40 mg total) by mouth nightly.        leuprolide (Eligard 3 Month) SUBQ injection 22.5 mg  22.5 mg Subcutaneous Q3 Months    22.5 mg at 12/23/24 1323       Review of Systems:    A comprehensive 14 point review of systems was completed.  Pertinent positives and negatives noted in the the HPI.     Vital Signs:  BP (!) 151/91 (BP Location: Left arm, Patient Position: Sitting, Cuff Size: adult)   Pulse 90   Temp 99.2 °F (37.3 °C) (Oral)   Resp 18   Ht 1.638 m (5' 4.5\")   Wt 81.2 kg (179 lb)   SpO2 96%   BMI 30.25 kg/m²     Physical Examination:    General: Patient is alert and oriented x 3, not in acute distress.  HEENT: EOMs intact. PERRL. Oropharynx is clear.   Neck: No JVD. No palpable lymphadenopathy. Neck is supple.  Lymphatics: There is no palpable lymphadenopathy throughout in the cervical, supraclavicular or axillary regions.  Chest: Clear to auscultation. No wheezes or rales.  Heart: Regular rate and rhythm. S1S2 normal.  Extremities: No edema or calf tenderness.  Neurological: Grossly intact.     Performance Status:    ECOG-0    Labs:    Imaging:    Reviewed PSMA films personally and with pt/family    Pathology:    Reviewd prostate bx as above    Impression:      ICD-10-CM    1. Prostate cancer (HCC)  C61       2. Metastasis to bone (HCC)  C79.51       3. Chemotherapy management, encounter for  Z51.11       4. Other secondary hypertension  I15.8         69-year-old male with de kristin metastatic high-grade [GG 5] prostate cancer with  extensive high-volume bony and lymphatic mets.  I had a long discussion with the patient explained that unfortunately he has an incurable cancer that is however very treatable with androgen deprivation therapy/ARPI as well as chemotherapy.  We discussed that given his excellent performance status and the high grade/high-volume disease he would be a good candidate for triplet therapy.  We discussed the improved PFS and OS benefits seen with this approach    He and his family want to proceed with triplet treatment    Plan:  Continue docetaxel cycle 4-day 1 today.  Labs are unremarkable  Anticipated side effects of chemotherapy including asthenia myelosuppression alopecia etc. were discussed in detail  Can resume Zometa, since he is not planning any dental work   Continue abiraterone 250mg with food (he has financial issues that preclude standard 1000mg dosing)  RTC for fu with next cycle of chemotherapy  HTN: Rosie induced, start hydrochlorothiazide 12.5   RTC 3 wks/chemo    Emotional Well Being:    Emotional Well Being discussed, patient aware of support systems available through the Cancer Center. No acute issues.     The diagnosis, prognosis, treatment goals, plan for treatment, and expected response was explained to the patient.       Thank you Dr Hung Oconnor MD for the opportunity to participate in the care of this interesting patient. Please do contact me if I may be of any further assistance    Thomas Pompa MD  [facility]     High complexity MDM. Our clinic is continuing focal point for all oncologic services the patient needs.         [1] No Known Allergies

## 2025-03-26 NOTE — TELEPHONE ENCOUNTER
Pt daughter is calling stating patient has complaints of blood in stool, may have hemorrhoids Lots of pain with sitting and lying down.    Please contact patient at [phone] and needs  for call.

## 2025-03-26 NOTE — TELEPHONE ENCOUNTER
Toxicities: C4 D1 Docetaxel/Zometa/Eligard on 3/19/2025    : Scarlett # [id number]    Constipation/Hemorrhoids    Olayinka reports he has been constipated. He took 30 ml of milk of magnesia on Sunday. He has also been eating a lot of papaya. He had one hard bowel movement that evening. No bowel movement on Monday. He did not take any milk of magnesia on Monday, Tuesday. He had a hard bowel movement on Tuesday and saw some blood on the toilet tissue. No blood in the toilet bowl water or stool. On his third wipe there was no more blood on the toilet tissue. He did not take any milk of magnesia today. He had one bowel movement this morning that started out very hard and then ended soft. No blood. He is having a burning pain by his rectum. It hurts to sit or lay down. He denies abdominal pain or cramping. No nausea or vomiting. He is drinking 80 oz of water daily. He is using the proctosol cream twice a day. He took 2 extra strength tylenol at 8 am today for pain of \"10/10.\" He is now rating his pain \"8/10.\"     I asked him to please hold. I updated Lucila. She said he should continue the laxative/stool softener and push fluids. He should continue to use the proctosol cream twice a day. He may take 2 extra strength tylenol every 6 hrs as needed. He may come for an ACV today. I updated Olayinka. I also asked him to start miralax along with the milk of magnesia. He should take 1 full capful in 4 oz of water. He should then take 30 ml of milk of magnesia now. Repeat both by 4 pm, if he has not gone. He should continue to push water, eat and walk around. Olayinka declined an ACV today. I told him if his symptoms are not improved to call the office tomorrow morning and I can book an ACV. He agreed with the plan.

## 2025-03-26 NOTE — TELEPHONE ENCOUNTER
Family called again at 5:30 PM.  Pain and constipation persists they would like to schedule an acute care visit tomorrow.  I will relay this message to our scheduling team.    Kapil Bobby, DO

## 2025-03-27 NOTE — ED PROVIDER NOTES
Patient Seen in: [facility] Emergency Department    History     Chief Complaint   Patient presents with    Bleeding       HPI    70-year-old male who is brought to the ER by his daughter due to he thinks he has a hemorrhoid that is bleeding.  More of the past few days.  No trouble with his bowel movements.  Patient has a history of prostate cancer currently getting treatment.  No nausea vomiting or diarrhea.    History reviewed.   Past Medical History:    CVA (cerebral vascular accident) (HCC)    GERD (gastroesophageal reflux disease)    Prediabetes       History reviewed. History reviewed. No pertinent surgical history.      Medications :  Prescriptions Prior to Admission[1]     No family history on file.    Smoking Status:   Social History     Socioeconomic History    Marital status:    Tobacco Use    Smoking status: Never     Passive exposure: Never    Smokeless tobacco: Never   Vaping Use    Vaping status: Never Used   Substance and Sexual Activity    Alcohol use: Not Currently    Drug use: Never       Constitutional and vital signs reviewed.      Social History and Family History elements reviewed from today, pertinent positives to the presenting problem noted.    Physical Exam     ED Triage Vitals [03/27/25 1247]   /75   Pulse 107   Resp 18   Temp 98.1 °F (36.7 °C)   Temp src    SpO2 96 %   O2 Device None (Room air)       All measures to prevent infection transmission during my interaction with the patient were taken. Handwashing was performed prior to and after the exam.  Stethoscope and any equipment used during my examination was cleaned with super sani-cloth germicidal wipes following the exam.     Physical Exam  Vitals and nursing note reviewed.   Cardiovascular:      Rate and Rhythm: Normal rate.      Pulses: Normal pulses.   Pulmonary:      Effort: Pulmonary effort is normal.   Abdominal:      Palpations: Abdomen is soft.   Genitourinary:     Rectum: Normal.   Skin:     General:  Skin is warm and dry.      Comments: Just above the rectum at the gluteal cleft there is an opening approximately 1.5 x 1 5 cm.  Healing well.  With some mild drainage no fluctuance.   Neurological:      Mental Status: He is alert.         ED Course      Labs Reviewed - No data to display    As Interpreted by me    Imaging Results Available and Reviewed while in ED: No results found.  ED Medications Administered: Medications - No data to display      [facility]     Vitals:    03/27/25 1247 03/27/25 1315   BP: 121/75 103/61   Pulse: 107 96   Resp: 18 16   Temp: 98.1 °F (36.7 °C)    SpO2: 96% 93%     *I personally reviewed and interpreted all ED vitals.    Pulse Ox: 93%, Room air, Normal       Differential Diagnosis/ Diagnostic Considerations: Hemorrhoid, cyst, abscess, cellulitis    Complicating Factors: The patient already has does not have any pertinent problems on file. to contribute to the complexity of this ED evaluation.    Medical Decision Making  Amount and/or Complexity of Data Reviewed  Independent Historian:      Details: Daughter at bedside  External Data Reviewed: notes.     Details: Reviewed outpatient oncology notes to see management and treatment    Risk  OTC drugs.  Prescription drug management.    I explained to daughter and patient that this is most likely a pilonidal cyst/abscess that opened up on its own.  does not need to be I&D.  Will start the patient on oral and topical antibiotics.  Show to Dr. Cotto cover it.  I explained her to use the antibiotic ointment and put Desitin over it and then put a dressing on it.  Do this at least twice a day and with every bowel movement.  She should follow-up with his primary care provider in 1 to 2 days for wound check.  Given surgical referral call to schedule appoint.  Also follow-up with her oncologist soon as possible.  Return to the ER if some continue, get worse, unable to follow-up    Condition upon leaving the department: Stable    Disposition and Plan      Clinical Impression:  1. Pilonidal abscess        Disposition:  Discharge    Follow-up:  Thomas Pompa MD  [address]  [place] [address]  [phone]    Follow up      Fransisco Brock MD  [address]  [place] [address]  [phone]    Follow up        Medications Prescribed:  Current Discharge Medication List        START taking these medications    Details   HYDROcodone-acetaminophen 5-325 MG Oral Tab Take 1-2 tablets by mouth every 6 (six) hours as needed for Pain.  Qty: 10 tablet, Refills: 0    Associated Diagnoses: Pilonidal abscess      sulfamethoxazole-trimethoprim -160 MG Oral Tab per tablet Take 1 tablet by mouth 2 (two) times daily for 10 days.  Qty: 20 tablet, Refills: 0      cephALEXin 500 MG Oral Cap Take 1 capsule (500 mg total) by mouth 3 (three) times daily for 10 days.  Qty: 30 capsule, Refills: 0      mupirocin 2 % External Ointment Apply 1 Application topically 3 (three) times daily for 14 days.  Qty: 1 each, Refills: 0                              [1] (Not in a hospital admission)

## 2025-03-27 NOTE — TELEPHONE ENCOUNTER
: Tammie # [id number]    I attempted to reach Olayinka at his home number. No answer. The  left a voice mail message that I am trying to reach him to schedule an ACV for him with JESS Barber this morning. He left a voice mail message asking Olayinka to return my call. I also called his daughter Sandy. She agreed to the ACV with JESS Barber at 10:30 am today.

## 2025-03-27 NOTE — DISCHARGE INSTRUCTIONS
Use topical and oral antibiotics.  Follow-up with your oncologist as soon as possible call to schedule appoint.  Also given surgical referral call to schedule appointment as well.  Follow-up with his primary care provider in 1 to 2 days return to the ER if some continue, get worse, unable to follow-up    Use antibióticos tópicos y orales. Bogdan un seguimiento con mullins oncólogo lo antes posible, llame para programar susi mechelle. También se le dion susi llamada de referencia quirúrgica para programar susi mechelle también. Seguimiento con mullins proveedor de atención primaria en 1 a 2 días Regrese a la basilio de emergencias si algunos continúan, empeoran, no pueden hacer seguimiento

## 2025-03-27 NOTE — PROGRESS NOTES
[facility] Office Note     Patient Name: Olayinka Echevarria   YOB: 1955   Medical Record Number: [id number]   CSN: [id number]   Consulting Physician: Thomas Pompa MD    Diagnosis  Prostate cancer    Current Therapy  Docetaxel C4D1 - given 3/19/25  Rosie/Pred -started 1/2025  ADT - started 12/24  Zometa 3mg q3wks - can resume now    INTERVAL HISTORY    Patient is here for an Acute Care visit due to ongoing rectal bleeding and pain, presents with his daughter. Declined language line and requested daughter translate.     He has been having intermittent BRBPR and rectal pain since January but in the past week he has been having consistent bleeding with bowel movements, 10/10 pain, and now feeling dizzy since this past weekend.  He is not constipated, having regular bowel movements daily that consistently cause pain and bright red blood when wiping.    He reports he fainted on Saturday and is feeling dizzy today.  Denies any other episode of fainting or falling.      He had been using Proctosol cream twice daily with little to no relief.   He has tried using Tylenol for pain relief but this only improves pain to 7/10 and having difficulty sleeping.     No other acute complaints.     Past Oncologic History   Olayinka Echevarria is a 70 year old male that was seen today in the Cancer Center for newly diagnosed de-kristin metastatic prostate cancer.  His oncologic history is detailed below    8/8/24 Screening PSA was elevated at 41. He has been asymptomatic previously    10/16/24 MRI of the prostate showed multiple highly suspicious PI-RADS 5 and 4 lesions in the right aspect the prostate gland with invasion of the right seminal vesicle.  Markedly enlarged right iliac chain lymph node and prominent left iliac chain lymph nodes were noted.    12/11/24 TRUS prostate biopsy showed multiple cores of [place] grade group 3 [4+3] prostatic adenocarcinoma in 9 of 9 sampled cores.  There were  3 cores with [place] grade group 5 [4+5] prostatic adenocarcinoma involving up to 75% of sampled tissue.    12/20/2024 PSMA PET scan showed PET avid foci within the prostate as well as seminal vesicle uptake.  Multiple PET avid lymph nodes within the pelvis retroperitoneum as well as the retrocrural area was noted.  There was also enlarged nodes in the left clavicle chain.  Several PET avid bone lesions throughout the spine and ribs sternum left scapular and pelvis were noted.    He started androgen deprivation therapy with a dose of Eligard on 12/20/2024 and was referred here for discussion regarding systemic therapy.    1/2025 given high-volume metastatic disease he was recommended triple therapy with docetaxel as well as abiraterone and prednisone in addition to ADT.      Past Medical History:  Past Medical History:    CVA (cerebral vascular accident) (HCC)    GERD (gastroesophageal reflux disease)    Prediabetes       Past Surgical History:  No past surgical history on file.    Family Medical History:  No family history on file.        Social History:  Social History     Socioeconomic History    Marital status:      Spouse name: Not on file    Number of children: Not on file    Years of education: Not on file    Highest education level: Not on file   Occupational History    Not on file   Tobacco Use    Smoking status: Never     Passive exposure: Never    Smokeless tobacco: Never   Vaping Use    Vaping status: Never Used   Substance and Sexual Activity    Alcohol use: Not Currently    Drug use: Never    Sexual activity: Not on file   Other Topics Concern    Not on file   Social History Narrative    Not on file     Social Drivers of Health     Food Insecurity: Not at Risk (1/20/2025)    Received from [employer]    Food Insecurity     Food: 1   Transportation Needs: Not at Risk (1/20/2025)    Received from [employer]    Transportation Needs     Transportation: 1   Housing Stability: Not at Risk (1/20/2025)    Received  from [facility]    Housing Stability     Housin       Allergies:   Allergies[1]    Current Medications:   hydroCHLOROthiazide 12.5 MG Oral Cap Take 1 capsule (12.5 mg total) by mouth daily. 30 capsule 6    predniSONE 5 MG Oral Tab Take 1 tablet (5 mg total) by mouth daily. 30 tablet 3    hydrocortisone (PROCTOSOL HC) 2.5 % External Cream Place 1 Application rectally 2 (two) times daily. 28 g 1    Abiraterone Acetate 250 MG Oral Tab Take 250 mg by mouth daily. Take with a low fat meal 30 tablet 6    prochlorperazine (COMPAZINE) 10 mg tablet Take 1 tablet (10 mg total) by mouth every 6 (six) hours as needed for Nausea. 30 tablet 3    ondansetron (ZOFRAN) 8 MG tablet Take 1 tablet (8 mg total) by mouth every 8 (eight) hours as needed for Nausea. 30 tablet 3    aspirin 81 MG Oral Tab EC Take 1 tablet (81 mg total) by mouth daily.      atorvastatin 40 MG Oral Tab Take 1 tablet (40 mg total) by mouth nightly.      celecoxib 50 MG Oral Cap Take 1 capsule (50 mg total) by mouth daily as needed.        leuprolide (Eligard 3 Month) SUBQ injection 22.5 mg  22.5 mg Subcutaneous Q3 Months    22.5 mg at 24 1323       Review of Systems:    A comprehensive 14 point review of systems was completed.  Pertinent positives and negatives noted in the the HPI.     Vital Signs:  BP (!) 155/97 (BP Location: Left arm, Patient Position: Sitting, Cuff Size: large)   Pulse 97   Temp 97.6 °F (36.4 °C) (Tympanic)   Resp 18   Ht 1.626 m (5' 4\")   Wt 78.3 kg (172 lb 9.6 oz)   SpO2 98%   BMI 29.63 kg/m²     Physical Examination:    General: Patient is alert and oriented x 3, not in acute distress.  HEENT: EOMs intact. PERRL. Oropharynx is clear.   Neck: No JVD. No palpable lymphadenopathy. Neck is supple.  Lymphatics: There is no palpable lymphadenopathy throughout in the cervical, supraclavicular or axillary regions.  Chest: Clear to auscultation. No wheezes or rales.  Heart: Regular rate and rhythm. S1S2 normal.  Extremities: No edema  or calf tenderness.  Neurological: Grossly intact.   Skin: Rectal exam limited due to sensitivity and patient reporting postural dizziness    Performance Status:    ECOG-0    Labs:    Imaging:    Reviewed PSMA films personally and with pt/family    Pathology:    Reviewd prostate bx as above    Impression:    1. Prostate cancer (HCC)  C61         2. Metastasis to bone (HCC)  C79.51         3. 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  I had a long discussion with the patient explained that unfortunately he has an incurable cancer that is however very treatable with androgen deprivation therapy/ARPI as well as chemotherapy.  We discussed that given his excellent performance status and the high grade/high-volume disease he would be a good candidate for triplet therapy.  We discussed the improved PFS and OS benefits seen with this approach    He and his family want to proceed with triplet treatment    Plan:  Continue docetaxel   Anticipated side effects of chemotherapy including asthenia myelosuppression alopecia etc. were discussed in detail  Can resume Zometa, since he is not planning any dental work   Continue abiraterone 250mg with food (he has financial issues that preclude standard 1000mg dosing)  HTN: Rosie induced, continue hydrochlorothiazide 12.5   RTC in 1 week  Acute care visit 3/27/25: pt reports increased bleeding from rectum with dizziness x 4-5 days including while in clinic that limits physical exam. Recommend ER evaluation, patient and family in agreement    Emotional Well Being:    Emotional Well Being discussed, patient aware of support systems available through the Cancer Center. No acute issues.     The diagnosis, prognosis, treatment goals, plan for treatment, and expected response was explained to the patient.     Elisabeth Mcwilliams,  APN  Hematology/Oncology              [1] No Known Allergies

## 2025-03-27 NOTE — ED INITIAL ASSESSMENT (HPI)
Bleeding hemorrhoid more severe the past 3 days. Syncope on Saturday while walking. Fell but did not hit head. Currently on chemo therapy for prostate cancer.

## 2025-04-03 NOTE — PROGRESS NOTES
[facility] Office Note     Patient Name: Olayinka Echevarria   YOB: 1955   Medical Record Number: [id number]   [facility]: [id number]   Consulting Physician: Thomas Pompa MD    Diagnosis  1. Prostate cancer (HCC)    2. Metastasis to bone (HCC)    3. Chemotherapy management, encounter for    4. Pilonidal abscess    5. CINV (chemotherapy-induced nausea and vomiting)      Current Therapy  Docetaxel C5D1 - due 4/9/25  Rosie/Pred -started 1/2025  ADT - started 12/24  Zometa 3mg q3wks     INTERVAL HISTORY  Patient returns for follow-up. Since the last visit he has done well.  He tolerated his last cycle of docetaxel but having more fatigue and nausea.  He had a near syncopal episode a few days after his chemotherapy.  He was seen in the emergency room last week with pain and bleeding around the rectum.  Diagnosed with pilonidal abscess.  He is started antibiotics and is undergoing regular dressings.  Saw general surgery and has a follow-up in next few weeks.    No recurrence of skin rash or leslie-anal erythema.      Past Oncologic History   Olayinka Echevarria is a 70 year old male that was seen today in the Cancer Center for newly diagnosed de-kristin metastatic prostate cancer.  His oncologic history is detailed below    8/8/24 Screening PSA was elevated at 41. He has been asymptomatic previously    10/16/24 MRI of the prostate showed multiple highly suspicious PI-RADS 5 and 4 lesions in the right aspect the prostate gland with invasion of the right seminal vesicle.  Markedly enlarged right iliac chain lymph node and prominent left iliac chain lymph nodes were noted.    12/11/24 TRUS prostate biopsy showed multiple cores of [place] grade group 3 [4+3] prostatic adenocarcinoma in 9 of 9 sampled cores.  There were 3 cores with [place] grade group 5 [4+5] prostatic adenocarcinoma involving up to 75% of sampled tissue.    12/20/2024 PSMA PET scan showed PET avid foci within the prostate as  well as seminal vesicle uptake.  Multiple PET avid lymph nodes within the pelvis retroperitoneum as well as the retrocrural area was noted.  There was also enlarged nodes in the left clavicle chain.  Several PET avid bone lesions throughout the spine and ribs sternum left scapular and pelvis were noted.    He started androgen deprivation therapy with a dose of Eligard on 2024 and was referred here for discussion regarding systemic therapy.    2025 given high-volume metastatic disease he was recommended triple therapy with docetaxel as well as abiraterone and prednisone in addition to ADT.      Past Medical History:  Past Medical History:    CVA (cerebral vascular accident) (HCC)    GERD (gastroesophageal reflux disease)    Prediabetes       Past Surgical History:  No past surgical history on file.    Family Medical History:  No family history on file.        Social History:  Social History     Socioeconomic History    Marital status:      Spouse name: Not on file    Number of children: Not on file    Years of education: Not on file    Highest education level: Not on file   Occupational History    Not on file   Tobacco Use    Smoking status: Never     Passive exposure: Never    Smokeless tobacco: Never   Vaping Use    Vaping status: Never Used   Substance and Sexual Activity    Alcohol use: Not Currently    Drug use: Never    Sexual activity: Not on file   Other Topics Concern    Not on file   Social History Narrative    Not on file     Social Drivers of Health     Food Insecurity: Not at Risk (2025)    Received from [facility]    Food Insecurity     Food: 1   Transportation Needs: Not at Risk (2025)    Received from [facility]    Transportation Needs     Transportation: 1   Housing Stability: Not at Risk (2025)    Received from [facility]    Housing Stability     Housin       Allergies:   Allergies[1]    Current Medications:   sulfamethoxazole-trimethoprim -160 MG Oral Tab per tablet Take 1  tablet by mouth 2 (two) times daily for 10 days. 20 tablet 0    cephALEXin 500 MG Oral Cap Take 1 capsule (500 mg total) by mouth 3 (three) times daily for 10 days. 30 capsule 0    mupirocin 2 % External Ointment Apply 1 Application topically 3 (three) times daily for 14 days. 1 each 0    hydroCHLOROthiazide 12.5 MG Oral Cap Take 1 capsule (12.5 mg total) by mouth daily. 30 capsule 6    predniSONE 5 MG Oral Tab Take 1 tablet (5 mg total) by mouth daily. 30 tablet 3    hydrocortisone (PROCTOSOL HC) 2.5 % External Cream Place 1 Application rectally 2 (two) times daily. 28 g 1    Abiraterone Acetate 250 MG Oral Tab Take 250 mg by mouth daily. Take with a low fat meal 30 tablet 6    prochlorperazine (COMPAZINE) 10 mg tablet Take 1 tablet (10 mg total) by mouth every 6 (six) hours as needed for Nausea. 30 tablet 3    ondansetron (ZOFRAN) 8 MG tablet Take 1 tablet (8 mg total) by mouth every 8 (eight) hours as needed for Nausea. 30 tablet 3    aspirin 81 MG Oral Tab EC Take 1 tablet (81 mg total) by mouth daily.      atorvastatin 40 MG Oral Tab Take 1 tablet (40 mg total) by mouth nightly.      celecoxib 50 MG Oral Cap Take 1 capsule (50 mg total) by mouth daily as needed.        leuprolide (Eligard 3 Month) SUBQ injection 22.5 mg  22.5 mg Subcutaneous Q3 Months    22.5 mg at 12/23/24 1323       Review of Systems:    A comprehensive 14 point review of systems was completed.  Pertinent positives and negatives noted in the the HPI.     Vital Signs:  /87 (BP Location: Left arm, Patient Position: Sitting, Cuff Size: adult)   Pulse 94   Temp 97.6 °F (36.4 °C) (Oral)   Resp 18   Ht 1.626 m (5' 4\")   Wt 81.2 kg (179 lb)   SpO2 97%   BMI 30.73 kg/m²     Physical Examination:    General: Patient is alert and oriented x 3, not in acute distress. Apperas more fatigued  HEENT: EOMs intact. PERRL. Oropharynx is clear.   Neck: No JVD. No palpable lymphadenopathy. Neck is supple.  Lymphatics: There is no palpable  lymphadenopathy throughout in the cervical, supraclavicular or axillary regions.  Chest: Clear to auscultation. No wheezes or rales.  Heart: Regular rate and rhythm. S1S2 normal.  Extremities: No edema or calf tenderness.  Neurological: Grossly intact.     Performance Status:    ECOG-0    Labs:    Imaging:    Reviewed PSMA films personally and with pt/family    Pathology:    Reviewd prostate bx as above    Impression:      ICD-10-CM    1. Prostate cancer (HCC)  C61       2. Metastasis to bone (HCC)  C79.51       3. Chemotherapy management, encounter for  Z51.11       4. Pilonidal abscess  L05.01       5. CINV (chemotherapy-induced nausea and vomiting)  R11.2     T45.1X5A         69-year-old male with de kristin metastatic high-grade [GG 5] prostate cancer with extensive high-volume bony and lymphatic mets.  I had a long discussion with the patient explained that unfortunately he has an incurable cancer that is however very treatable with androgen deprivation therapy/ARPI as well as chemotherapy.  We discussed that given his excellent performance status and the high grade/high-volume disease he would be a good candidate for triplet therapy.  We discussed the improved PFS and OS benefits seen with this approach    He and his family want to proceed with triplet treatment    Plan:  Continue docetaxel cycle 5-next week , given increased fatigue and nausea, will reduce docetaxel to 60mg/m2, labs next week  Can resume Zometa, since he is not planning any dental work   Continue abiraterone 250mg with food (he has financial issues that preclude standard 1000mg dosing)  RTC for fu with next cycle of chemotherapy  HTN: Rosie induced, better on hydrochlorothiazide 12.5   Pilonidal abscess; continue abx, and surgery fu. He will complete chemo in 4 wks if surgery needed     Emotional Well Being:    Emotional Well Being discussed, patient aware of support systems available through the Cancer Center. No acute issues.     The diagnosis,  prognosis, treatment goals, plan for treatment, and expected response was explained to the patient.       Thank you Dr Hung Oconnor MD for the opportunity to participate in the care of this interesting patient. Please do contact me if I may be of any further assistance    Thomas Pompa MD  [facility]     High complexity MDM. Our clinic is continuing focal point for all oncologic services the patient needs.         [1] No Known Allergies

## 2025-04-03 NOTE — H&P
New Patient Visit Note       Active Problems      1. Perianal abscess    2. Wound check, abscess        Chief Complaint   Chief Complaint   Patient presents with    Cyst     Patient is here with complaints of a pilonidal cyst.  States it began causing pain.  Drainage is yellow in color.  Placed on antibiotics, has three days left.       History of Present Illness   Olayinka is a pleasant 70 year old patient presenting for consultation after ED visit 3/27 for leslie-rectal abscess. Daughter present, patient Czech speaking and provides translation.He reports he started having perianal discomfort a few days before the ED visit that was worsening over time. He also notes bleeding per rectum and dizziness. It was hard to walk and sit down. He also had swelling, erythema, and bleeding from his anus, leading him to believe it was hemorrhoidal bleeding. Evaluation revealed perianal abscess that had spontaneously ruptured. He was discharged to home with bactrim, keflex and topical antibiotics.     He reports since then, he feels much improved. The pain, swelling, bleeding, and redness is minimal at this time. He is not dizzy. He still endorses some discharge that is decreasing. His family is helping him change the dressings twice daily especially after bowel movements and cleaning the wound with soap and water. He is still taking oral antibiotics, applying topical antibiotics and rash cream.     Of note, patient has PMH of prostate cancer with mets, currently undergoing triple chemotherapy managed by Dr. Pompa. They have an appointment today.       Allergies  Olayinka has No Known Allergies.    Past Medical / Surgical / Social / Family History    The past medical and past surgical history have been reviewed by me today.    Past Medical History:    CVA (cerebral vascular accident) (HCC)    GERD (gastroesophageal reflux disease)    Prediabetes     History reviewed. No pertinent surgical history.    The family history and social  history have been reviewed by me today.    History reviewed. No pertinent family history.  Social History     Socioeconomic History    Marital status:    Tobacco Use    Smoking status: Never     Passive exposure: Never    Smokeless tobacco: Never   Vaping Use    Vaping status: Never Used   Substance and Sexual Activity    Alcohol use: Not Currently    Drug use: Never        Current Outpatient Medications:     sulfamethoxazole-trimethoprim -160 MG Oral Tab per tablet, Take 1 tablet by mouth 2 (two) times daily for 10 days., Disp: 20 tablet, Rfl: 0    cephALEXin 500 MG Oral Cap, Take 1 capsule (500 mg total) by mouth 3 (three) times daily for 10 days., Disp: 30 capsule, Rfl: 0    mupirocin 2 % External Ointment, Apply 1 Application topically 3 (three) times daily for 14 days., Disp: 1 each, Rfl: 0    hydroCHLOROthiazide 12.5 MG Oral Cap, Take 1 capsule (12.5 mg total) by mouth daily., Disp: 30 capsule, Rfl: 6    predniSONE 5 MG Oral Tab, Take 1 tablet (5 mg total) by mouth daily., Disp: 30 tablet, Rfl: 3    hydrocortisone (PROCTOSOL HC) 2.5 % External Cream, Place 1 Application rectally 2 (two) times daily., Disp: 28 g, Rfl: 1    Abiraterone Acetate 250 MG Oral Tab, Take 250 mg by mouth daily. Take with a low fat meal, Disp: 30 tablet, Rfl: 6    prochlorperazine (COMPAZINE) 10 mg tablet, Take 1 tablet (10 mg total) by mouth every 6 (six) hours as needed for Nausea., Disp: 30 tablet, Rfl: 3    ondansetron (ZOFRAN) 8 MG tablet, Take 1 tablet (8 mg total) by mouth every 8 (eight) hours as needed for Nausea., Disp: 30 tablet, Rfl: 3    aspirin 81 MG Oral Tab EC, Take 1 tablet (81 mg total) by mouth daily., Disp: , Rfl:     atorvastatin 40 MG Oral Tab, Take 1 tablet (40 mg total) by mouth nightly., Disp: , Rfl:     celecoxib 50 MG Oral Cap, Take 1 capsule (50 mg total) by mouth daily as needed., Disp: , Rfl:       Review of Systems  The Review of Systems has been reviewed by me during today.  Review of Systems    Constitutional:  Negative for chills, fatigue and fever.   Respiratory:  Negative for shortness of breath.    Cardiovascular:  Negative for chest pain and leg swelling.   Gastrointestinal:  Negative for abdominal pain, constipation, diarrhea, nausea and vomiting.   Skin:  Positive for wound.       Physical Findings   /89 (BP Location: Right arm, Patient Position: Sitting, Cuff Size: large)   Pulse 96   Wt 172 lb (78 kg)   BMI 29.52 kg/m²   Physical Exam  Constitutional:       General: He is not in acute distress.     Appearance: Normal appearance. He is not ill-appearing.   HENT:      Head: Normocephalic and atraumatic.      Mouth/Throat:      Mouth: Mucous membranes are moist.      Pharynx: Oropharynx is clear.   Eyes:      Extraocular Movements: Extraocular movements intact.      Conjunctiva/sclera: Conjunctivae normal.      Pupils: Pupils are equal, round, and reactive to light.   Cardiovascular:      Rate and Rhythm: Normal rate and regular rhythm.      Pulses: Normal pulses.      Heart sounds: Normal heart sounds.   Pulmonary:      Effort: Pulmonary effort is normal.      Breath sounds: Normal breath sounds.   Abdominal:      General: Abdomen is flat. Bowel sounds are normal.      Palpations: Abdomen is soft.   Genitourinary:     Comments: Cutaneous opening noted at 1 oclock position, patient in left lateral decub. Position. Opening gently probed with wooden edge of cotton tip applicator. Apparent blind end tract, approx 2 cm deep not tracking towards rectum. Purulent drainage able to be expressed. Minimal erythema or swelling. Wound dressing with gauze and tape.   Neurological:      Mental Status: He is alert.             Assessment   1. Perianal abscess    2. 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 Continued drainage from perianal abscess. Recommend conservative management at this time given patient is currently undergoing chemotherapy,  possible consultation with surgeon far in future should cutaneous fistula persist    Recommend finishing oral course of antibiotics and continue twice daily dressings and care recommended by ED provider    Patient to follow up in 2 weeks for wound check, sooner if worsening symptoms    Patient and daughter expressing understanding and agreement.      No orders of the defined types were placed in this encounter.      Imaging & Referrals   None    Follow Up  No follow-ups on file.    [facility]

## 2025-04-09 NOTE — PROGRESS NOTES
Olayinka to infusion today for C5 D1 taxotere + zometa. Arrives Ambulating independently, accompanied by Family member     Patient was evaluated today by Treatment Nurse. Labs reviewed and WDL for treatment today. Feeling much better today than last week. Pt denies any recent or planned dental work. No jaw pain or other jaw issues.    Oral medications included in this regimen:  yes, prednisone and abiraterone    Patient confirms comprehension of cancer treatment schedule:  yes    Pregnancy screening:  Not applicable    Modifications in dose or schedule:  Yes, pt was deferred last week due to increased fatigue and nausea. This week taxotere is dose reduced to 60 mg/m2    Medications appearance and physical integrity checked by RN: yes.    Chemotherapy IV pump settings verified by 2 RNs:  Yes.  Frequency of blood return and site check throughout administration: Prior to administration, Prior to each drug, and At completion of therapy     Infusion/treatment outcome:  patient tolerated treatment without incident    Education Record    Learner:  Patient  Barriers / Limitations:  None  Method:  Reinforcement  Education / instructions given:  Plan of care  Outcome:  Shows understanding    Discharged Home, Ambulating independently, accompanied by:Family member    Patient/family verbalized understanding of future appointments: by printed AVS

## 2025-04-17 NOTE — PROGRESS NOTES
Follow Up Visit Note       Active Problems      1. Open wound    2. Wound check, abscess          Chief Complaint   Chief Complaint   Patient presents with    Post-Op     Perianal abscess.Pt hypertensive and instructed to follow up with PCP.         History of Present Illness  Olayinka is a pleasant 70 year old year old patient presenting for wound check. He had a leslie-rectal abscess with tenderness, swelling, redness. He went to the ED 3/27 for perirectal abscess which spontaneously drained. He is s/p oral and topical antibiotic course. Last seen in this office 4/3 with improvement of symptoms.     Today, he feels much improved. He still has scant drainage and mild tenderness in his leslie-anal area when wiping. Denies bleeding or swelling. He is without fever or chills.     He is currently receiving triple therapy chemotherapy for high volume stage 4 prostate cancer with Dr. Pompa, last dose 4/9, next dose 4/30.       Allergies  Olayinka has no known allergies.    Past Medical / Surgical / Social / Family History    The past medical and past surgical history have been reviewed by me today.    Past Medical History[1]  Past Surgical History[2]    The family history and social history have been reviewed by me today.    Family History[3]  Social Hx on file[4]   Medications - Current[5]     Review of Systems  The Review of Systems has been reviewed by me during today.  Review of Systems   Constitutional:  Negative for chills, fatigue and fever.   Respiratory:  Negative for shortness of breath.    Cardiovascular:  Negative for chest pain and leg swelling.   Gastrointestinal:  Negative for abdominal pain, constipation, diarrhea, nausea and vomiting.   Skin:  Positive for wound.        Physical Findings   BP (!) 149/96 (BP Location: Right arm, Patient Position: Sitting, Cuff Size: adult)   Pulse 63   Physical Exam  Constitutional:       General: He is not in acute distress.     Appearance: He is not ill-appearing.   HENT:       Head: Normocephalic and atraumatic.   Eyes:      Extraocular Movements: Extraocular movements intact.      Conjunctiva/sclera: Conjunctivae normal.      Pupils: Pupils are equal, round, and reactive to light.   Abdominal:      General: Abdomen is flat. There is no distension.      Palpations: Abdomen is soft.      Tenderness: There is no abdominal tenderness.   Genitourinary:     Comments: Cutaneous opening noted at 1 oclock position again. Scant drainage expressed, mild TTP. No erythema or swelling. Suspicious for cutaneous fistula. Wound dressing with gauze  Skin:     General: Skin is warm.   Neurological:      Mental Status: He is alert.          Assessment   1. Open wound    2. 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     Plan   Patient with much improved wound healing, discussed with patient and daughter surgical management options.   Patient to first complete final round of chemotherapy and then discuss whether they would like to pursue conservative management of leslie-rectal abscess or surgical management. If they would like to pursue surgical management, recommend consultation with general surgeon.   Patient instructed to keep cleaning area thoroughly and to discontinue abx ointment.   Patient and daughter expressed understanding and in agreement.        No orders of the defined types were placed in this encounter.      Imaging & Referrals   None    Follow Up  No follow-ups on file.    BECCA Fraser          [1]   Past Medical History:   CVA (cerebral vascular accident) (HCC)    GERD (gastroesophageal reflux disease)    Prediabetes   [2] No past surgical history on file.  [3] No family history on file.  [4]   Social History  Socioeconomic History    Marital status:    Tobacco Use    Smoking status: Never     Passive exposure: Never    Smokeless tobacco: Never   Vaping Use    Vaping status: Never Used   Substance and Sexual Activity    Alcohol use: Not Currently    Drug use: Never    [5]   Current Outpatient Medications:     hydroCHLOROthiazide 12.5 MG Oral Cap, Take 1 capsule (12.5 mg total) by mouth daily., Disp: 30 capsule, Rfl: 6    predniSONE 5 MG Oral Tab, Take 1 tablet (5 mg total) by mouth daily., Disp: 30 tablet, Rfl: 3    hydrocortisone (PROCTOSOL HC) 2.5 % External Cream, Place 1 Application rectally 2 (two) times daily., Disp: 28 g, Rfl: 1    Abiraterone Acetate 250 MG Oral Tab, Take 250 mg by mouth daily. Take with a low fat meal, Disp: 30 tablet, Rfl: 6    prochlorperazine (COMPAZINE) 10 mg tablet, Take 1 tablet (10 mg total) by mouth every 6 (six) hours as needed for Nausea., Disp: 30 tablet, Rfl: 3    ondansetron (ZOFRAN) 8 MG tablet, Take 1 tablet (8 mg total) by mouth every 8 (eight) hours as needed for Nausea., Disp: 30 tablet, Rfl: 3    aspirin 81 MG Oral Tab EC, Take 1 tablet (81 mg total) by mouth daily., Disp: , Rfl:     atorvastatin 40 MG Oral Tab, Take 1 tablet (40 mg total) by mouth nightly., Disp: , Rfl:     celecoxib 50 MG Oral Cap, Take 1 capsule (50 mg total) by mouth daily as needed., Disp: , Rfl:

## 2025-04-30 NOTE — PROGRESS NOTES
[facility] Oncology [facility] Office Note     Patient Name: Olayinka Echevarria   YOB: 1955   Medical Record Number: [id number]   [facility]: [id number]   Consulting Physician: Thomas Pompa MD    Diagnosis  1. Chemotherapy management, encounter for    2. Metastasis to bone (HCC)    3. Prostate cancer (HCC)    4. Chemotherapy-induced neuropathy (HCC)    5. Chemotherapy-induced fatigue      Current Therapy  Docetaxel C6D1 - due 4/30/25  Rosie/Pred -started 1/2025  ADT - started 12/24  Zometa 3mg q3wks     INTERVAL HISTORY  Patient returns for follow-up. Since the last visit he has done well.  He tolerated his last cycle of docetaxel better.  Some fatigue and muscle weakness but reports this is manageable.  No febrile illness.  No flareup of his pilonidal abscess.  He does have some neuropathy that does not affect his ADLs    Past Oncologic History   Olayinka Echevarria is a 70 year old male that was seen today in the Cancer [facility] for newly diagnosed de-kristin metastatic prostate cancer.  His oncologic history is detailed below    8/8/24 Screening PSA was elevated at 41. He has been asymptomatic previously    10/16/24 MRI of the prostate showed multiple highly suspicious PI-RADS 5 and 4 lesions in the right aspect the prostate gland with invasion of the right seminal vesicle.  Markedly enlarged right iliac chain lymph node and prominent left iliac chain lymph nodes were noted.    12/11/24 TRUS prostate biopsy showed multiple cores of Josefina grade group 3 [4+3] prostatic adenocarcinoma in 9 of 9 sampled cores.  There were 3 cores with [place] grade group 5 [4+5] prostatic adenocarcinoma involving up to 75% of sampled tissue.    12/20/2024 PSMA PET scan showed PET avid foci within the prostate as well as seminal vesicle uptake.  Multiple PET avid lymph nodes within the pelvis retroperitoneum as well as the retrocrural area was noted.  There was also enlarged nodes in the left clavicle chain.  Several PET  avid bone lesions throughout the spine and ribs sternum left scapular and pelvis were noted.    He started androgen deprivation therapy with a dose of Eligard on 2024 and was referred here for discussion regarding systemic therapy.    2025 given high-volume metastatic disease he was recommended triple therapy with docetaxel as well as abiraterone and prednisone in addition to ADT.      Past Medical History:  Past Medical History:    CVA (cerebral vascular accident) (HCC)    GERD (gastroesophageal reflux disease)    Prediabetes       Past Surgical History:  No past surgical history on file.    Family Medical History:  No family history on file.        Social History:  Social History     Socioeconomic History    Marital status:      Spouse name: Not on file    Number of children: Not on file    Years of education: Not on file    Highest education level: Not on file   Occupational History    Not on file   Tobacco Use    Smoking status: Never     Passive exposure: Never    Smokeless tobacco: Never   Vaping Use    Vaping status: Never Used   Substance and Sexual Activity    Alcohol use: Not Currently    Drug use: Never    Sexual activity: Not on file   Other Topics Concern    Not on file   Social History Narrative    Not on file     Social Drivers of Health     Food Insecurity: Not at Risk (2025)    Received from [employer]    Food Insecurity     Food: 1   Transportation Needs: Not at Risk (2025)    Received from [employer]    Transportation Needs     Transportation: 1   Housing Stability: Not at Risk (2025)    Received from [employer]    Housing Stability     Housin       Allergies:   Allergies[1]    Current Medications:   Abiraterone Acetate 250 MG Oral Tab Take 250 mg by mouth daily. Take with a low fat meal 30 tablet 11    predniSONE 5 MG Oral Tab Take 1 tablet (5 mg total) by mouth daily. 30 tablet 5    hydroCHLOROthiazide 12.5 MG Oral Cap Take 1 capsule (12.5 mg total) by mouth daily. 30 capsule  6    hydrocortisone (PROCTOSOL HC) 2.5 % External Cream Place 1 Application rectally 2 (two) times daily. 28 g 1    prochlorperazine (COMPAZINE) 10 mg tablet Take 1 tablet (10 mg total) by mouth every 6 (six) hours as needed for Nausea. 30 tablet 3    ondansetron (ZOFRAN) 8 MG tablet Take 1 tablet (8 mg total) by mouth every 8 (eight) hours as needed for Nausea. 30 tablet 3    aspirin 81 MG Oral Tab EC Take 1 tablet (81 mg total) by mouth in the morning.      atorvastatin 40 MG Oral Tab Take 1 tablet (40 mg total) by mouth nightly.      celecoxib 50 MG Oral Cap Take 1 capsule (50 mg total) by mouth daily as needed.        leuprolide (Eligard 3 Month) SUBQ injection 22.5 mg  22.5 mg Subcutaneous Q3 Months    22.5 mg at 12/23/24 1323       Review of Systems:    A comprehensive 14 point review of systems was completed.  Pertinent positives and negatives noted in the the HPI.     Vital Signs:  /90 (BP Location: Left arm, Patient Position: Sitting, Cuff Size: adult)   Pulse 89   Temp 98.9 °F (37.2 °C) (Oral)   Resp 18   Ht 1.626 m (5' 4\")   Wt 82.4 kg (181 lb 9.6 oz)   SpO2 96%   BMI 31.17 kg/m²     Physical Examination:    General: Patient is alert and oriented x 3, not in acute distress. Apperas more fatigued  HEENT: EOMs intact. PERRL. Oropharynx is clear.   Neck: No JVD. No palpable lymphadenopathy. Neck is supple.  Lymphatics: There is no palpable lymphadenopathy throughout in the cervical, supraclavicular or axillary regions.  Chest: Clear to auscultation. No wheezes or rales.  Heart: Regular rate and rhythm. S1S2 normal.  Extremities: No edema or calf tenderness.  Neurological: Grossly intact.     Performance Status:    ECOG-0    Labs:    Imaging:    Reviewed PSMA films personally and with pt/family    Pathology:    Reviewd prostate bx as above    Impression:      ICD-10-CM    1. Chemotherapy management, encounter for  Z51.11       2. Metastasis to bone (HCC)  C79.51 predniSONE 5 MG Oral Tab      3.  Prostate cancer (HCC)  C61 predniSONE 5 MG Oral Tab      4. Chemotherapy-induced neuropathy (HCC)  G62.0     T45.1X5A       5. Chemotherapy-induced fatigue  R53.83     T45.1X5A         69-year-old male with de kristin metastatic high-grade [GG 5] prostate cancer with extensive high-volume bony and lymphatic mets.  I had a long discussion with the patient explained that unfortunately he has an incurable cancer that is however very treatable with androgen deprivation therapy/ARPI as well as chemotherapy.  We discussed that given his excellent performance status and the high grade/high-volume disease he would be a good candidate for triplet therapy.  We discussed the improved PFS and OS benefits seen with this approach    He and his family want to proceed with triplet treatment    Plan:  Continue docetaxel cycle 6-today , given increased fatigue and neuropathy will continue at 60mg/m2  Can resume Zometa, since he is not planning any dental work , dose today and then q3mos  Continue abiraterone 250mg with food (he has financial issues that preclude standard 1000mg dosing)  Redose Eligard in June  RTC for fu in June  HTN: Rosie induced, better on hydrochlorothiazide 12.5, refilled  Pilonidal abscess; continue abx, and surgery fu. He can undergo surgery anytime after the next 3 wks when his counts have recovered    Emotional Well Being:    Emotional Well Being discussed, patient aware of support systems available through the Cancer Center. No acute issues.     The diagnosis, prognosis, treatment goals, plan for treatment, and expected response was explained to the patient.       Thank you Dr Hung Oconnor MD for the opportunity to participate in the care of this interesting patient. Please do contact me if I may be of any further assistance    Thomas Pompa MD  [facility]     High complexity MDM. Our clinic is continuing focal point for all oncologic services the patient needs.         [1] No  Known Allergies

## 2025-04-30 NOTE — PROGRESS NOTES
SW provided pt with signed handicap placard document.    Stella LEMUS, LCSW  [facility] Licensed Clinical

## 2025-04-30 NOTE — PROGRESS NOTES
Pt here for C6D1 Drug(s)TAXOTERE-ZOMETA.  Arrives Ambulating independently, accompanied by Family member     Patient was evaluated today by MD and Treatment Nurse.    R FA PIV started in lab, good blood return noted.    Oral medications included in this regimen:  yes - Abiraterone    Patient confirms comprehension of cancer treatment schedule:  yes    Pregnancy screening:  Not applicable    Modifications in dose or schedule:  No    Medications appearance and physical integrity checked by RN: yes.    Chemotherapy IV pump settings verified by 2 RNs:  Yes.  Frequency of blood return and site check throughout administration: Prior to administration, Prior to each drug, and At completion of therapy     Infusion/treatment outcome:  patient tolerated treatment without incident    PIV removed.    Education Record    Learner:  Patient and Family Member  Barriers / Limitations:  None  Method:  Discussion  Education / instructions given:  medication, plan of care, schedule  Outcome:  Shows understanding    Discharged Home, Ambulating independently, accompanied by:Family member    Patient/family verbalized understanding of future appointments: by [employer] messaging

## 2025-06-18 NOTE — PROGRESS NOTES
[facility]ematology Oncology [facility] Office Note     Patient Name: Olayinka Echevarria   YOB: 1955   Medical Record Number: [id number]   [facility]: [id number]   Consulting Physician: Thomas Pompa MD    Diagnosis  1. Prostate cancer (HCC)    2. Metastasis to bone (HCC)    3. Chemotherapy-induced neuropathy (HCC)    4. Type 2 diabetes mellitus without complication, without long-term current use of insulin (HCC)      Current Therapy  Rosie/Pred -started 1/2025  ADT - started 12/24  Zometa now q 3mos  S/p 6 cycles of Docetaxel 4/30/25    INTERVAL HISTORY  Patient returns for follow-up. Since the last visit he he has completed his chemotherapy.  Fatigue has improved.  No flareup of his pilonidal abscess.  Neuropathy is stable.    He unfortunately continues to have hyperglycemia.  Prior A1c was 7.8 but he has not seen his PCP.      Past Oncologic History   Olayinka Echevarria is a 70 year old male that was seen today in the Cancer Center for newly diagnosed de-kristin metastatic prostate cancer.  His oncologic history is detailed below    8/8/24 Screening PSA was elevated at 41. He has been asymptomatic previously    10/16/24 MRI of the prostate showed multiple highly suspicious PI-RADS 5 and 4 lesions in the right aspect the prostate gland with invasion of the right seminal vesicle.  Markedly enlarged right iliac chain lymph node and prominent left iliac chain lymph nodes were noted.    12/11/24 TRUS prostate biopsy showed multiple cores of Josefina grade group 3 [4+3] prostatic adenocarcinoma in 9 of 9 sampled cores.  There were 3 cores with Josefina grade group 5 [4+5] prostatic adenocarcinoma involving up to 75% of sampled tissue.    12/20/2024 PSMA PET scan showed PET avid foci within the prostate as well as seminal vesicle uptake.  Multiple PET avid lymph nodes within the pelvis retroperitoneum as well as the retrocrural area was noted.  There was also enlarged nodes in the left clavicle chain.  Several PET  avid bone lesions throughout the spine and ribs sternum left scapular and pelvis were noted.    He started androgen deprivation therapy with a dose of Eligard on 2024 and was referred here for discussion regarding systemic therapy.    2025 given high-volume metastatic disease he was recommended triple therapy with docetaxel as well as abiraterone and prednisone in addition to ADT.    2025 completed 6 cycles of docetaxel and continues on abiraterone and prednisone.  PSA is now undetectable    Past Medical History:  Past Medical History:    CVA (cerebral vascular accident) (HCC)    GERD (gastroesophageal reflux disease)    Prediabetes       Past Surgical History:  No past surgical history on file.    Family Medical History:  No family history on file.        Social History:  Social History     Socioeconomic History    Marital status:      Spouse name: Not on file    Number of children: Not on file    Years of education: Not on file    Highest education level: Not on file   Occupational History    Not on file   Tobacco Use    Smoking status: Never     Passive exposure: Never    Smokeless tobacco: Never   Vaping Use    Vaping status: Never Used   Substance and Sexual Activity    Alcohol use: Not Currently    Drug use: Never    Sexual activity: Not on file   Other Topics Concern    Not on file   Social History Narrative    Not on file     Social Drivers of Health     Food Insecurity: Not at Risk (2025)    Received from [employer]    Food Insecurity     Food: 1   Transportation Needs: Not at Risk (2025)    Received from [employer]    Transportation Needs     Transportation: 1   Housing Stability: Not at Risk (2025)    Received from [employer]    Housing Stability     Housin       Allergies:   Allergies[1]    Current Medications:   predniSONE 2.5 MG Oral Tab Take 1 tablet (2.5 mg total) by mouth daily. 30 tablet 5    metFORMIN HCl 500 MG Oral Tab Take 1 tablet (500 mg total) by mouth 2 (two) times  daily with meals. 60 tablet 1    Abiraterone Acetate 250 MG Oral Tab Take 250 mg by mouth daily. Take with a low fat meal 30 tablet 11    hydroCHLOROthiazide 12.5 MG Oral Cap Take 1 capsule (12.5 mg total) by mouth daily. 30 capsule 6    aspirin 81 MG Oral Tab EC Take 1 tablet (81 mg total) by mouth in the morning.      atorvastatin 40 MG Oral Tab Take 1 tablet (40 mg total) by mouth nightly.      celecoxib 50 MG Oral Cap Take 1 capsule (50 mg total) by mouth daily as needed.        leuprolide (Eligard 3 Month) SUBQ injection 22.5 mg  22.5 mg Subcutaneous Q3 Months    22.5 mg at 12/23/24 1323       Review of Systems:    A comprehensive 14 point review of systems was completed.  Pertinent positives and negatives noted in the the HPI.     Vital Signs:  /83 (BP Location: Left arm, Patient Position: Sitting, Cuff Size: large)   Pulse 81   Temp 98.8 °F (37.1 °C) (Oral)   Resp 18   Ht 1.626 m (5' 4\")   Wt 85.5 kg (188 lb 8 oz)   SpO2 95%   BMI 32.36 kg/m²     Physical Examination:    General: Patient is alert and oriented x 3, not in acute distress. Apperas more fatigued  HEENT: EOMs intact. PERRL. Oropharynx is clear.   Neck: No JVD. No palpable lymphadenopathy. Neck is supple.  Lymphatics: There is no palpable lymphadenopathy throughout in the cervical, supraclavicular or axillary regions.  Chest: Clear to auscultation. No wheezes or rales.  Heart: Regular rate and rhythm. S1S2 normal.  Extremities: No edema or calf tenderness.  Neurological: Grossly intact.     Performance Status:    ECOG-0    Labs:    Imaging:    Reviewed PSMA films personally and with pt/family    Pathology:    Reviewd prostate bx as above    Impression:      ICD-10-CM    1. Prostate cancer (HCC)  C61 predniSONE 2.5 MG Oral Tab      2. Metastasis to bone (HCC)  C79.51 predniSONE 2.5 MG Oral Tab      3. Chemotherapy-induced neuropathy (HCC)  G62.0     T45.1X5A       4. Type 2 diabetes mellitus without complication, without long-term  current use of insulin (HCC)  E11.9         70-year-old male with de kristin metastatic high-grade [GG 5] prostate cancer with extensive high-volume bony and lymphatic mets.  I had a long discussion with the patient explained that unfortunately he has an incurable cancer that is however very treatable with androgen deprivation therapy/ARPI as well as chemotherapy.  We discussed that given his excellent performance status and the high grade/high-volume disease he would be a good candidate for triplet therapy.  We discussed the improved PFS and OS benefits seen with this approach    He and his family want to proceed with triplet treatment.  He completed 6 cycles of docetaxel chemotherapy and currently on abiraterone prednisone    Plan:  Leuprolide q3mos. his PSA is now undetectable suggesting a good response to chemotherapy and ADT  Resume Zometa, since he is not planning any dental work , dose today and then q3mos  Continue abiraterone 250mg with food (he has financial issues that preclude standard 1000mg dosing)  Now has diabetes.  Not seen PCP recently.  Start metformin 500 mg twice daily and see PCP soon.  Will reduce dose of prednisone to 2.5 mg daily  RTC for fu in 3 mos  HTN: Rosie induced, better on hydrochlorothiazide 12.5    Emotional Well Being:    Emotional Well Being discussed, patient aware of support systems available through the Cancer Center. No acute issues.     The diagnosis, prognosis, treatment goals, plan for treatment, and expected response was explained to the patient.       Thank you Dr Hung Oconnor MD for the opportunity to participate in the care of this interesting patient. Please do contact me if I may be of any further assistance    Thomas Pompa MD  [facility]     High complexity MDM. Our clinic is continuing focal point for all oncologic services the patient needs.         [1] No Known Allergies

## 2025-06-18 NOTE — PROGRESS NOTES
Olayinka to infusion today for Zometa. Pt denies any issues or concerns. Switched to q3 month dosing going forward per Dr. Pompa's office. Next appt scheduled to coincide w/ next Eligard injection. Labs reviewed and WDL for zometa infusion. Calcium level of 9.2. Pt denies any recent or planned invasive dental work.     Ordering Provider: Aletha  Order Exp: 6/18/26     Pt tolerated infusion without difficulty or complaint. Reviewed next apt date/time: 9/17 for labs, MD visit, and infusion      Education Record  Learner:  Patient  Disease / Diagnosis: Prostate ca w/ bone mets  Barriers / Limitations:  None  Method:  Reinforcement  General Topics:  Plan of care reviewed  Outcome:  Shows understanding

## 2025-06-19 NOTE — TELEPHONE ENCOUNTER
Returned call to Sandy, instructed her for Olayinka to take Metformin 500 mg twice a day with meals, Breakfast and Dinner. She voiced understanding and thanked me for the call.

## (undated) NOTE — Clinical Note
Thank you Dr Hung Oconnor for the opportunity to participate in the care of this interesting patient. Please do contact me if I may be of any further assistance  Thomas Pompa MD [facility]ology Oncology [facility]